# Patient Record
Sex: MALE | Race: WHITE | NOT HISPANIC OR LATINO | Employment: OTHER | URBAN - METROPOLITAN AREA
[De-identification: names, ages, dates, MRNs, and addresses within clinical notes are randomized per-mention and may not be internally consistent; named-entity substitution may affect disease eponyms.]

---

## 2020-09-23 DIAGNOSIS — Z12.11 ENCOUNTER FOR SCREENING COLONOSCOPY: Primary | ICD-10-CM

## 2020-09-23 DIAGNOSIS — Z86.010 HISTORY OF ADENOMATOUS POLYP OF COLON: ICD-10-CM

## 2020-09-25 PROBLEM — Z86.0101 HISTORY OF ADENOMATOUS POLYP OF COLON: Status: ACTIVE | Noted: 2020-09-25

## 2020-09-25 PROBLEM — Z12.11 ENCOUNTER FOR SCREENING COLONOSCOPY: Status: ACTIVE | Noted: 2020-09-25

## 2020-09-25 PROBLEM — Z86.010 HISTORY OF ADENOMATOUS POLYP OF COLON: Status: ACTIVE | Noted: 2020-09-25

## 2020-11-16 ENCOUNTER — TRANSCRIBE ORDERS (OUTPATIENT)
Dept: SLEEP MEDICINE | Facility: HOSPITAL | Age: 70
End: 2020-11-16

## 2020-11-16 DIAGNOSIS — Z01.818 OTHER SPECIFIED PRE-OPERATIVE EXAMINATION: Primary | ICD-10-CM

## 2020-12-11 ENCOUNTER — LAB (OUTPATIENT)
Dept: LAB | Facility: HOSPITAL | Age: 70
End: 2020-12-11

## 2020-12-11 DIAGNOSIS — Z01.818 OTHER SPECIFIED PRE-OPERATIVE EXAMINATION: ICD-10-CM

## 2020-12-11 PROCEDURE — U0004 COV-19 TEST NON-CDC HGH THRU: HCPCS

## 2020-12-11 PROCEDURE — C9803 HOPD COVID-19 SPEC COLLECT: HCPCS

## 2020-12-12 LAB — SARS-COV-2 RNA RESP QL NAA+PROBE: NOT DETECTED

## 2020-12-14 ENCOUNTER — ANESTHESIA (OUTPATIENT)
Dept: GASTROENTEROLOGY | Facility: HOSPITAL | Age: 70
End: 2020-12-14

## 2020-12-14 ENCOUNTER — ANESTHESIA EVENT (OUTPATIENT)
Dept: GASTROENTEROLOGY | Facility: HOSPITAL | Age: 70
End: 2020-12-14

## 2020-12-14 ENCOUNTER — HOSPITAL ENCOUNTER (OUTPATIENT)
Facility: HOSPITAL | Age: 70
Setting detail: HOSPITAL OUTPATIENT SURGERY
Discharge: HOME | End: 2020-12-14
Attending: SURGERY | Admitting: SURGERY

## 2020-12-14 VITALS
BODY MASS INDEX: 38.6 KG/M2 | HEIGHT: 72 IN | SYSTOLIC BLOOD PRESSURE: 117 MMHG | OXYGEN SATURATION: 99 % | HEART RATE: 61 BPM | WEIGHT: 285 LBS | TEMPERATURE: 98.2 F | RESPIRATION RATE: 16 BRPM | DIASTOLIC BLOOD PRESSURE: 82 MMHG

## 2020-12-14 DIAGNOSIS — Z12.11 ENCOUNTER FOR SCREENING COLONOSCOPY: ICD-10-CM

## 2020-12-14 DIAGNOSIS — Z86.010 HISTORY OF ADENOMATOUS POLYP OF COLON: ICD-10-CM

## 2020-12-14 PROBLEM — K63.5 POLYP OF ASCENDING COLON: Status: ACTIVE | Noted: 2020-12-14

## 2020-12-14 PROCEDURE — 25010000002 PROPOFOL 10 MG/ML EMULSION: Performed by: ANESTHESIOLOGY

## 2020-12-14 PROCEDURE — 88305 TISSUE EXAM BY PATHOLOGIST: CPT | Performed by: SURGERY

## 2020-12-14 PROCEDURE — 45380 COLONOSCOPY AND BIOPSY: CPT | Performed by: SURGERY

## 2020-12-14 PROCEDURE — 25010000002 GLUCAGON (RDNA) PER 1 MG: Performed by: SURGERY

## 2020-12-14 PROCEDURE — 45385 COLONOSCOPY W/LESION REMOVAL: CPT | Performed by: SURGERY

## 2020-12-14 PROCEDURE — 45381 COLONOSCOPY SUBMUCOUS NJX: CPT | Performed by: SURGERY

## 2020-12-14 RX ORDER — PROPOFOL 10 MG/ML
VIAL (ML) INTRAVENOUS AS NEEDED
Status: DISCONTINUED | OUTPATIENT
Start: 2020-12-14 | End: 2020-12-14 | Stop reason: SURG

## 2020-12-14 RX ORDER — ATORVASTATIN CALCIUM 10 MG/1
10 TABLET, FILM COATED ORAL DAILY
COMMUNITY
End: 2022-10-24 | Stop reason: HOSPADM

## 2020-12-14 RX ORDER — LISINOPRIL 5 MG/1
5 TABLET ORAL DAILY
COMMUNITY

## 2020-12-14 RX ORDER — LIDOCAINE HYDROCHLORIDE 20 MG/ML
INJECTION, SOLUTION INFILTRATION; PERINEURAL AS NEEDED
Status: DISCONTINUED | OUTPATIENT
Start: 2020-12-14 | End: 2020-12-14 | Stop reason: SURG

## 2020-12-14 RX ORDER — SODIUM CHLORIDE, SODIUM LACTATE, POTASSIUM CHLORIDE, CALCIUM CHLORIDE 600; 310; 30; 20 MG/100ML; MG/100ML; MG/100ML; MG/100ML
30 INJECTION, SOLUTION INTRAVENOUS CONTINUOUS PRN
Status: DISCONTINUED | OUTPATIENT
Start: 2020-12-14 | End: 2020-12-14 | Stop reason: HOSPADM

## 2020-12-14 RX ORDER — ALLOPURINOL 100 MG/1
100 TABLET ORAL DAILY
COMMUNITY

## 2020-12-14 RX ORDER — ASPIRIN 81 MG/1
81 TABLET, CHEWABLE ORAL DAILY
COMMUNITY

## 2020-12-14 RX ADMIN — LIDOCAINE HYDROCHLORIDE 100 MG: 20 INJECTION, SOLUTION INFILTRATION; PERINEURAL at 10:07

## 2020-12-14 RX ADMIN — PROPOFOL 500 MG: 10 INJECTION, EMULSION INTRAVENOUS at 10:07

## 2020-12-14 RX ADMIN — SODIUM CHLORIDE, POTASSIUM CHLORIDE, SODIUM LACTATE AND CALCIUM CHLORIDE 30 ML/HR: 600; 310; 30; 20 INJECTION, SOLUTION INTRAVENOUS at 09:44

## 2020-12-14 NOTE — ANESTHESIA POSTPROCEDURE EVALUATION
Patient: Paulino Pimentel    Procedure Summary     Date: 12/14/20 Room / Location:  [facility]    Anesthesia Start: 1005 Anesthesia Stop: 1049    Procedure: COLONOSCOPY to CECUM AND TI WITH COLD BX/HOT SNARE POLYPECTOMY, NS INJECTION, TATTOO INJECTION (N/A ) Diagnosis:       Encounter for screening colonoscopy      History of adenomatous polyp of colon      (Encounter for screening colonoscopy [Z12.11])      (History of adenomatous polyp of colon [Z86.010])    Surgeon: Anne Cho MD Provider: Lamine Pinon MD    Anesthesia Type: MAC ASA Status: 3          Anesthesia Type: MAC    Vitals  Vitals Value Taken Time   /82 12/14/20 1108   Temp     Pulse 61 12/14/20 1108   Resp 16 12/14/20 1108   SpO2 99 % 12/14/20 1108           Post Anesthesia Care and Evaluation      Comments: Pt. Discharged prior to being evaluated by anesthesia.  Chart is reviewed and no complications are noted.  THIS CASE IS NOT MEDICALLY DIRECTED

## 2020-12-14 NOTE — H&P
Cc: Endoscopy Visit    HPI: 70 y.o. male here for screening with adenomatous polyps in 2010.      Past Medical History:   Diagnosis Date   • Arthritis    • Atrial fibrillation (CMS/HCC)    • Colon polyps    • Sleep apnea     CPAP       Past Surgical History:   Procedure Laterality Date   • CARDIAC SURGERY      HEART STENTS   • COLONOSCOPY     • GALLBLADDER SURGERY  2009   • JOINT REPLACEMENT     • REPLACEMENT TOTAL KNEE BILATERAL  7/2019, 3/2020       is allergic to erythromycin.       Medication List      ASK your doctor about these medications    allopurinol 100 MG tablet  Commonly known as: ZYLOPRIM     apixaban 2.5 MG tablet tablet  Commonly known as: ELIQUIS     aspirin 81 MG chewable tablet     atorvastatin 10 MG tablet  Commonly known as: LIPITOR     dronedarone 400 MG tablet  Commonly known as: MULTAQ     lisinopril 5 MG tablet  Commonly known as: PRINIVIL,ZESTRIL            History reviewed. No pertinent family history.    Social History     Socioeconomic History   • Marital status:      Spouse name: Not on file   • Number of children: Not on file   • Years of education: Not on file   • Highest education level: Not on file   Tobacco Use   • Smoking status: Never Smoker   • Smokeless tobacco: Never Used       Vitals:    12/14/20 0920   BP: 130/73   Pulse: 60   Resp: 12   Temp: 98.2 °F (36.8 °C)   SpO2: 97%       Body mass index is 38.65 kg/m².    Physical Exam    General: No acute distress  Lungs: No labored breathing, Pulse oximetry on room air is 97%.  Heart: RRR  Abdomen: Soft  Mental:  Awake, alert, and oriented    Imp:     · Screening  · adenomatous polyps in 2010.       Plan:  · C scope    Anne Cho MD  10:08 EST

## 2020-12-14 NOTE — OP NOTE
Colonoscopy Procedure Note  Paulino Pimentel  1950  Date of Procedure: 12/14/20    Pre-operative Diagnosis:    · Screening  · adenomatous polyps in 2010.     Post-operative Diagnosis:  · Cecal polyp, 4 mm.  Removed via cold biopsy forceps.   · Irregular ileocecal polyp, transversely oriented, partially retrohaustral, 2.3 cm by 5 mm.  Removed via saline lift, crescent snare (1/2, remaining repeatedly pulling out of the snare), cold biopsy (remaining polyp) and fulguration (edges), with inking (either side).  Felt removed in it's entirety at completion.  Area of question would be retrohaustral.   · Distal transverse 5 mm polyp, rounded.  Removed via snare cautery.  · Proximal descending 5 mm polyp, flat.  Removed via snare cautery.  · Diverticulosis, moderate, descending and sigmoid.    Procedure: Colonoscopy with saline lift, crescent snare, cold biopsy and fulguration, with inking.       Recommendations:   · Colonoscopy in 6 months to ensure complete removal of the ileocecal valve polyp, then 2 years if clear.  The office will call within the next  3-10 days with a final recommendation.  · Keep a copy of the photographs of the procedure given to you today for possible need for reference in the future.      Surgeon: Tammie    Anesthetic: MAC per Lamine Pinon MD    Scope Withdrawal Time:  27 minutes  12 seconds    Procedure Details     MAC anesthesia was induced.  The 180 Colonoscopy was inserted blindly into the rectum and advanced to the cecum, with relative ease,  without need for pressure, lift, or turning.    Cecum was identified by the appendiceal orifice and the ileocecal valve and photographed for documentation.      Prep quality was good but not great.  A careful inspection was made as the scope was withdrawn, including a retroflexed view of the rectum; there was no suggestion of presence of angiodysplasias, or colitis, but there were the aforementioned polyps and diverticula, with interventions as  listed.  Settings on the cautery were 1 and 11 with tissue retrieved via suction thru the scope.     Retroflexion in the rectum revealed no abnormalities.      Anne Cho MD  12/14/20

## 2020-12-14 NOTE — ANESTHESIA PREPROCEDURE EVALUATION
Anesthesia Evaluation     NPO Solid Status: > 8 hours  NPO Liquid Status: > 4 hours           Airway   Mallampati: II  TM distance: >3 FB  Neck ROM: full  no difficulty expected  Dental - normal exam     Pulmonary - normal exam   (+) sleep apnea,   Cardiovascular - normal exam    (+) dysrhythmias Atrial Fib,       Neuro/Psych  GI/Hepatic/Renal/Endo    (+) obesity, morbid obesity,      Musculoskeletal     Abdominal  - normal exam   Substance History      OB/GYN          Other   arthritis,                      Anesthesia Plan    ASA 3     MAC       Anesthetic plan, all risks, benefits, and alternatives have been provided, discussed and informed consent has been obtained with: patient.

## 2020-12-15 LAB
CYTO UR: NORMAL
LAB AP CASE REPORT: NORMAL
PATH REPORT.FINAL DX SPEC: NORMAL
PATH REPORT.GROSS SPEC: NORMAL

## 2020-12-16 DIAGNOSIS — D12.2 ADENOMATOUS POLYP OF ASCENDING COLON: Primary | ICD-10-CM

## 2020-12-16 NOTE — PROGRESS NOTES
Kate (endoscopy liaison),    Please call patient to inform them of these pathology findings (CAPITALIZED) and recommendations (CAPITALIZED) that have been added to my operative note.  Please ensure that any pamphlets that were to be given to the patient at the hospital were received.     Please make sure a letter is sent to the patient, recall method entered into the computer and the HIM tab updated as far as need for endoscopy follow up.    Thanks  Dr Cho    Colonoscopy Procedure Note  Paulino Pimentel  1950  Date of Procedure: 12/14/20     Pre-operative Diagnosis:    · Screening  · adenomatous polyps in 2010.      Post-operative Diagnosis:  · Cecal polyp, 4 mm.  Removed via cold biopsy forceps. ; TUBULAR ADENOMA  · Irregular ileocecal polyp, transversely oriented, partially retrohaustral, 2.3 cm by 5 mm.  Removed via saline lift, crescent snare (1/2, remaining repeatedly pulling out of the snare), cold biopsy (remaining polyp) and fulguration (edges), with inking (either side).  Felt removed in it's entirety at completion.  Area of question would be retrohaustral.;  TUBULOVILLOUS ADENOMA   · Distal transverse 5 mm polyp, rounded.  Removed via snare cautery;  TUBULAR ADENOMA.  · Proximal descending 5 mm polyp, flat.  Removed via snare cautery.;  TUBULAR ADENOMA  · Diverticulosis, moderate, descending and sigmoid.     Procedure: Colonoscopy with saline lift, crescent snare, cold biopsy and fulguration, with inking.    Recommendations:   · Colonoscopy in 6 months to ensure complete removal of the ileocecal valve polyp, then 2 years if clear.  The office will call within the next  3-10 days with a final recommendation.;  YES 6 MONTHS.  CASE REQUEST ALREADY ENTERED.  · Keep a copy of the photographs of the procedure given to you today for possible need for reference in the future.

## 2020-12-17 ENCOUNTER — TELEPHONE (OUTPATIENT)
Dept: SURGERY | Facility: CLINIC | Age: 70
End: 2020-12-17

## 2020-12-17 NOTE — TELEPHONE ENCOUNTER
----- Message from Anne Cho MD sent at 12/16/2020  7:56 AM EST -----  Kate (endoscopy liaison),    Please call patient to inform them of these pathology findings (CAPITALIZED) and recommendations (CAPITALIZED) that have been added to my operative note.  Please ensure that any pamphlets that were to be given to the patient at the hospital were received.     Please make sure a letter is sent to the patient, recall method entered into the computer and the HIM tab updated as far as need for endoscopy follow up.    Thanks  Dr Cho    Colonoscopy Procedure Note  Paulino Margarito  1950  Date of Procedure: 12/14/20     Pre-operative Diagnosis:    · Screening  · adenomatous polyps in 2010.      Post-operative Diagnosis:  · Cecal polyp, 4 mm.  Removed via cold biopsy forceps. ; TUBULAR ADENOMA  · Irregular ileocecal polyp, transversely oriented, partially retrohaustral, 2.3 cm by 5 mm.  Removed via saline lift, crescent snare (1/2, remaining repeatedly pulling out of the snare), cold biopsy (remaining polyp) and fulguration (edges), with inking (either side).  Felt removed in it's entirety at completion.  Area of question would be retrohaustral.;  TUBULOVILLOUS ADENOMA   · Distal transverse 5 mm polyp, rounded.  Removed via snare cautery;  TUBULAR ADENOMA.  · Proximal descending 5 mm polyp, flat.  Removed via snare cautery.;  TUBULAR ADENOMA  · Diverticulosis, moderate, descending and sigmoid.     Procedure: Colonoscopy with saline lift, crescent snare, cold biopsy and fulguration, with inking.                                        Recommendations:   · Colonoscopy in 6 months to ensure complete removal of the ileocecal valve polyp, then 2 years if clear.  The office will call within the next  3-10 days with a final recommendation.;  YES 6 MONTHS.  CASE REQUEST ALREADY ENTERED.  · Keep a copy of the photographs of the procedure given to you today for possible need for reference in the future.

## 2022-10-21 RX ORDER — LEVOTHYROXINE SODIUM 0.1 MG/1
100 TABLET ORAL DAILY
COMMUNITY

## 2022-10-21 RX ORDER — ROSUVASTATIN CALCIUM 10 MG/1
10 TABLET, COATED ORAL DAILY
COMMUNITY

## 2022-10-21 RX ORDER — AMLODIPINE BESYLATE 5 MG/1
5 TABLET ORAL DAILY
COMMUNITY

## 2022-10-21 RX ORDER — BUPROPION HYDROCHLORIDE 150 MG/1
150 TABLET, EXTENDED RELEASE ORAL DAILY
COMMUNITY

## 2022-10-24 ENCOUNTER — ANESTHESIA EVENT (OUTPATIENT)
Dept: GASTROENTEROLOGY | Facility: HOSPITAL | Age: 72
End: 2022-10-24

## 2022-10-24 ENCOUNTER — ANESTHESIA (OUTPATIENT)
Dept: GASTROENTEROLOGY | Facility: HOSPITAL | Age: 72
End: 2022-10-24

## 2022-10-24 ENCOUNTER — HOSPITAL ENCOUNTER (OUTPATIENT)
Facility: HOSPITAL | Age: 72
Setting detail: HOSPITAL OUTPATIENT SURGERY
Discharge: HOME | End: 2022-10-24
Attending: SURGERY | Admitting: SURGERY

## 2022-10-24 VITALS
RESPIRATION RATE: 18 BRPM | HEIGHT: 72 IN | OXYGEN SATURATION: 97 % | SYSTOLIC BLOOD PRESSURE: 127 MMHG | HEART RATE: 49 BPM | DIASTOLIC BLOOD PRESSURE: 53 MMHG | WEIGHT: 271 LBS | BODY MASS INDEX: 36.7 KG/M2

## 2022-10-24 DIAGNOSIS — D12.2 ADENOMATOUS POLYP OF ASCENDING COLON: ICD-10-CM

## 2022-10-24 PROCEDURE — 25010000002 GLUCAGON (RDNA) PER 1 MG: Performed by: SURGERY

## 2022-10-24 PROCEDURE — 45385 COLONOSCOPY W/LESION REMOVAL: CPT | Performed by: SURGERY

## 2022-10-24 PROCEDURE — 25010000002 PROPOFOL 10 MG/ML EMULSION: Performed by: ANESTHESIOLOGY

## 2022-10-24 PROCEDURE — 88305 TISSUE EXAM BY PATHOLOGIST: CPT | Performed by: SURGERY

## 2022-10-24 RX ORDER — PROPOFOL 10 MG/ML
VIAL (ML) INTRAVENOUS AS NEEDED
Status: DISCONTINUED | OUTPATIENT
Start: 2022-10-24 | End: 2022-10-24 | Stop reason: SURG

## 2022-10-24 RX ORDER — LIDOCAINE HYDROCHLORIDE 20 MG/ML
INJECTION, SOLUTION INFILTRATION; PERINEURAL AS NEEDED
Status: DISCONTINUED | OUTPATIENT
Start: 2022-10-24 | End: 2022-10-24 | Stop reason: SURG

## 2022-10-24 RX ORDER — SODIUM CHLORIDE, SODIUM LACTATE, POTASSIUM CHLORIDE, CALCIUM CHLORIDE 600; 310; 30; 20 MG/100ML; MG/100ML; MG/100ML; MG/100ML
1000 INJECTION, SOLUTION INTRAVENOUS CONTINUOUS
Status: DISCONTINUED | OUTPATIENT
Start: 2022-10-24 | End: 2022-10-24 | Stop reason: HOSPADM

## 2022-10-24 RX ADMIN — SODIUM CHLORIDE, POTASSIUM CHLORIDE, SODIUM LACTATE AND CALCIUM CHLORIDE 1000 ML: 600; 310; 30; 20 INJECTION, SOLUTION INTRAVENOUS at 12:24

## 2022-10-24 RX ADMIN — PROPOFOL 200 MCG/KG/MIN: 10 INJECTION, EMULSION INTRAVENOUS at 13:41

## 2022-10-24 RX ADMIN — PROPOFOL 200 MG: 10 INJECTION, EMULSION INTRAVENOUS at 13:41

## 2022-10-24 RX ADMIN — LIDOCAINE HYDROCHLORIDE 60 MG: 20 INJECTION, SOLUTION INFILTRATION; PERINEURAL at 13:41

## 2022-10-24 NOTE — OP NOTE
Colonoscopy Procedure Note  Paulino Pimentel  1950  Date of Procedure: 10/24/22    Pre-operative Diagnosis:    · history of 3 tubular adenomas, one tubulovillous adenoma 2020  · 2 tubular adenomas 2010     Post-operative Diagnosis:  · Ileocecal valve 1.5 cm, partially retrohaustral, rounded, polyp, adjacent to prior ink, small adjoining 3 mm flat polyp.  Removed via snare (piecemeal) cautery and fulguration.  · Descending colon polyps X 2, 5 and 7 mm.  Removed via snare cautery.  · Sigmoid diverticulosis, moderate.    Procedure: Colonoscopy with polypectomy       Recommendations:   · Colonoscopy in 2 years likely, due to the appearance of possible recurrence.  The office will call within the next  3-10 days with a final recommendation.  · Review diverticulosis info given today.  · Keep a copy of the photographs of the procedure given to you today for possible need for reference in the future.      Surgeon: Tammie    Anesthetic: MAC per Orion Andre MD    Scope Withdrawal Time:  17 minutes  40 seconds    Procedure Details     MAC anesthesia was induced.  The 180 Colonoscopy was inserted blindly into the rectum and advanced to the cecum, with relative ease,  without need for pressure, lift, or turning.  However, to get well down into the cecal bowl, lift was needed.  Cecum was identified by the appendiceal orifice and the ileocecal valve and photographed for documentation.      Prep quality was moderate with difficulty visualizing in the cecum due to liquid stool.  A careful inspection was made as the scope was withdrawn, including a retroflexed view of the rectum; there was no suggestion of presence of angiodysplasias, or colitis, but there were the polyps and the diverticula, with noted interventions. Settings on the cautery were 1 and 11 with tissue retrieved via suction thru the scope.     Retroflexion in the rectum revealed no abnormalities.      Anne Cho MD  10/24/22

## 2022-10-24 NOTE — DISCHARGE INSTRUCTIONS
For the next 24 hours patient needs to be with a responsible adult.    For 24 hours DO NOT drive, operate machinery, appliances, drink alcohol, make important decisions or sign legal documents.    Start with a light or bland diet if you are feeling sick to your stomach otherwise advance to regular diet as tolerated.    Follow recommendations on procedure report if provided by your doctor.    Call Dr Boone for problems [phone]    Problems may include but not limited to: large amounts of bleeding, trouble breathing, repeated vomiting, severe unrelieved pain, fever or chills.

## 2022-10-24 NOTE — ANESTHESIA POSTPROCEDURE EVALUATION
Patient: Paulino Pimentel    Procedure Summary     Date: 10/24/22 Room / Location:  [facility]    Anesthesia Start: 1336 Anesthesia Stop: 1411    Procedure: COLONOSCOPY TO CECUM WITH HOT SNARE POLYPECTOMIES Diagnosis:       Adenomatous polyp of ascending colon      (Adenomatous polyp of ascending colon [D12.2])    Surgeons: Anne Cho MD Provider: Orion Andre MD    Anesthesia Type: MAC ASA Status: 3          Anesthesia Type: MAC    Vitals  No vitals data found for the desired time range.          Post Anesthesia Care and Evaluation    Patient location during evaluation: PHASE II  Patient participation: complete - patient participated  Level of consciousness: awake and alert  Pain management: adequate    Airway patency: patent  Anesthetic complications: No anesthetic complications  PONV Status: none  Cardiovascular status: acceptable and hemodynamically stable  Respiratory status: acceptable, nonlabored ventilation and spontaneous ventilation  Hydration status: acceptable

## 2022-10-24 NOTE — H&P
Cc: Endoscopy Visit    HPI: 72 y.o. male here for screening with prior history of 3 tubular adenomas, one tubulovillous adenoma 2020, 2 tubular adenomas 2010.  She has no family history of colon cancer.     Past Medical History:   Diagnosis Date   • Arthritis    • Atrial fibrillation (HCC)    • Colon polyps    • Sleep apnea     CPAP       Past Surgical History:   Procedure Laterality Date   • CARDIAC SURGERY      HEART STENTS   • COLONOSCOPY     • COLONOSCOPY N/A 12/14/2020    Procedure: COLONOSCOPY to CECUM AND TI WITH COLD BX/HOT SNARE POLYPECTOMY, NS INJECTION, TATTOO INJECTION;  Surgeon: Anne Cho MD;  Location: [facility];  Service: General;  Laterality: N/A;  HX POLYPS, SCREENING  --POLYPS (4), DIVERTICULOSIS    • GALLBLADDER SURGERY  2009   • JOINT REPLACEMENT     • REPLACEMENT TOTAL KNEE BILATERAL  7/2019, 3/2020       is allergic to metoprolol and erythromycin.       Medication List      ASK your doctor about these medications    allopurinol 100 MG tablet  Commonly known as: ZYLOPRIM     amLODIPine 5 MG tablet  Commonly known as: NORVASC     apixaban 2.5 MG tablet tablet  Commonly known as: ELIQUIS  Take 1 tablet by mouth Every 12 (Twelve) Hours.     aspirin 81 MG chewable tablet     atorvastatin 10 MG tablet  Commonly known as: LIPITOR     buPROPion  MG 12 hr tablet  Commonly known as: WELLBUTRIN SR     dronedarone 400 MG tablet  Commonly known as: MULTAQ     levothyroxine 100 MCG tablet  Commonly known as: SYNTHROID, LEVOTHROID     lisinopril 5 MG tablet  Commonly known as: PRINIVIL,ZESTRIL     rosuvastatin 10 MG tablet  Commonly known as: CRESTOR            Family History   Problem Relation Age of Onset   • Malig Hyperthermia Neg Hx        Social History     Socioeconomic History   • Marital status:    Tobacco Use   • Smoking status: Never   • Smokeless tobacco: Never       Vitals:    10/24/22 1217   BP: 138/77   Pulse: 61   Resp: 18   SpO2: 99%       Body mass index is 36.75  kg/m².    Physical Exam    General: No acute distress  Lungs: No labored breathing, Pulse oximetry on room air is 99%.  Heart/EKG: RRR  Abdomen: no complaints of pain  Mental:  Awake, alert, and oriented    Imp:     · history of 3 tubular adenomas, one tubulovillous adenoma 2020  · 2 tubular adenomas 2010      Plan:  · C scope    Anne Cho MD  13:03 EDT

## 2022-10-24 NOTE — ANESTHESIA PREPROCEDURE EVALUATION
Anesthesia Evaluation     Patient summary reviewed and Nursing notes reviewed   NPO Solid Status: > 8 hours  NPO Liquid Status: > 6 hours           Airway   Mallampati: III  TM distance: >3 FB  Neck ROM: full  Possible difficult intubation  Dental - normal exam     Pulmonary - normal exam   (+) sleep apnea on CPAP,   Cardiovascular     (+) hypertension 2 medications or greater, dysrhythmias Paroxysmal Atrial Fib,       Neuro/Psych  GI/Hepatic/Renal/Endo    (+) obesity, morbid obesity,      Musculoskeletal     Abdominal   (+) obese,    Substance History      OB/GYN          Other   arthritis,                      Anesthesia Plan    ASA 3     MAC     (POM due to morbid obesity and DEMETRIO)  intravenous induction     Anesthetic plan, risks, benefits, and alternatives have been provided, discussed and informed consent has been obtained with: patient.        CODE STATUS:

## 2022-10-25 LAB
LAB AP CASE REPORT: NORMAL
LAB AP DIAGNOSIS COMMENT: NORMAL
PATH REPORT.FINAL DX SPEC: NORMAL
PATH REPORT.GROSS SPEC: NORMAL

## 2022-10-26 ENCOUNTER — TELEPHONE (OUTPATIENT)
Dept: SURGERY | Facility: CLINIC | Age: 72
End: 2022-10-26

## 2022-10-26 NOTE — PROGRESS NOTES
Kate (endoscopy liaison),    Please call patient tto inform them of these findings and recommendations and ensure that any pamphlets that were to be given to the patient at the hospital were received.     Please make sure a letter is sent to the patient, recall method entered into the computer and the HM (Health Maintenance) tab updated as far as need for endoscopy follow up.    Thanks  Dr Cho    Colonoscopy Procedure Note  Paulino Pimentel  1950  Date of Procedure: 10/24/22     Pre-operative Diagnosis:    · history of 3 tubular adenomas, one tubulovillous adenoma 2020  · 2 tubular adenomas 2010      Post-operative Diagnosis:  · Ileocecal valve 1.5 cm, partially retrohaustral, rounded, polyp, adjacent to prior ink, small adjoining 3 mm flat polyp.  Removed via snare (piecemeal) cautery and fulguration.;  TUBULOVILLOUS ADENOMA  · Descending colon polyps X 2, 5 and 7 mm.  Removed via snare cautery.;  HYPERPLASTIC.  · Sigmoid diverticulosis, moderate.     Procedure: Colonoscopy with polypectomy    Recommendations:   · Colonoscopy in 2 years likely, due to the appearance of possible recurrence.  The office will call within the next  3-10 days with a final recommendation.;  YES 1 OR 2 YEARS, AT HIS PREFERENCE.   · Review diverticulosis info given today.  · Keep a copy of the photographs of the procedure given to you today for possible need for reference in the future.

## 2022-10-26 NOTE — TELEPHONE ENCOUNTER
----- Message from Anne Cho MD sent at 10/26/2022 10:36 AM EDT -----  Kate (endoscopy liaison),    Please call patient tto inform them of these findings and recommendations and ensure that any pamphlets that were to be given to the patient at the hospital were received.     Please make sure a letter is sent to the patient, recall method entered into the computer and the HM (Health Maintenance) tab updated as far as need for endoscopy follow up.    Thanks  Dr Cho    Colonoscopy Procedure Note  Paulino Pimentel  1950  Date of Procedure: 10/24/22     Pre-operative Diagnosis:    · history of 3 tubular adenomas, one tubulovillous adenoma 2020  · 2 tubular adenomas 2010      Post-operative Diagnosis:  · Ileocecal valve 1.5 cm, partially retrohaustral, rounded, polyp, adjacent to prior ink, small adjoining 3 mm flat polyp.  Removed via snare (piecemeal) cautery and fulguration.;  TUBULOVILLOUS ADENOMA  · Descending colon polyps X 2, 5 and 7 mm.  Removed via snare cautery.;  HYPERPLASTIC.  · Sigmoid diverticulosis, moderate.     Procedure: Colonoscopy with polypectomy                                        Recommendations:   · Colonoscopy in 2 years likely, due to the appearance of possible recurrence.  The office will call within the next  3-10 days with a final recommendation.;  YES 1 OR 2 YEARS, AT HIS PREFERENCE.   · Review diverticulosis info given today.  · Keep a copy of the photographs of the procedure given to you today for possible need for reference in the future.

## 2022-10-27 ENCOUNTER — TELEPHONE (OUTPATIENT)
Dept: SURGERY | Facility: CLINIC | Age: 72
End: 2022-10-27

## 2022-10-27 NOTE — TELEPHONE ENCOUNTER
Provider:   Caller: ANNA CARMONA   Relationship to Patient: SELF  Pharmacy:   Phone Number: [phone]   Reason for Call:  RECTAL BLEEDING AFTER FIRST BOWEL MOVEMENT SINCE CSCOPE  When was the patient last seen: 10.24.22  When did it start: 10.26.22  Where is it located: RECTUM  Characteristics of symptom/severity: RECTAL BLEEDING- LIQUID STOOL NOT SOLID YET; COLORED MOSTLY RED. MENTIONS A FILLING-UP SENSATION. ALSO FEELS LIGHTHEADED.   Timing- Is it constant or intermittent: STARTED 10.24.22 AND THROUGHOUT THE NIGHT  What makes it worse:   What makes it better:   What therapies/medications have you tried:       HUB UNABLE TO WARM TRANSFER

## 2022-10-28 NOTE — TELEPHONE ENCOUNTER
Patient returning MA's call. He states that the bleeding began Monday, subsided Tuesday and Wednesday, began again Wednesday night until around 3:00 am this morning. He confirmed that it was bright red blood with clots.  I spoke directly with Dr. Cho and she has advised that the patient hold his Eliquis for 5 days but to call back on Monday if he is still experiencing bleeding. I relayed this to the patient and he voiced understanding.

## 2024-10-06 ENCOUNTER — HOSPITAL ENCOUNTER (INPATIENT)
Facility: HOSPITAL | Age: 74
LOS: 3 days | Discharge: HOME | End: 2024-10-09
Attending: EMERGENCY MEDICINE | Admitting: SURGERY
Payer: MEDICARE

## 2024-10-06 ENCOUNTER — APPOINTMENT (OUTPATIENT)
Dept: CT IMAGING | Facility: HOSPITAL | Age: 74
End: 2024-10-06
Payer: MEDICARE

## 2024-10-06 ENCOUNTER — APPOINTMENT (OUTPATIENT)
Dept: GENERAL RADIOLOGY | Facility: HOSPITAL | Age: 74
End: 2024-10-06
Payer: MEDICARE

## 2024-10-06 DIAGNOSIS — R10.84 GENERALIZED ABDOMINAL PAIN: Primary | ICD-10-CM

## 2024-10-06 DIAGNOSIS — K56.609 SMALL BOWEL OBSTRUCTION: ICD-10-CM

## 2024-10-06 DIAGNOSIS — R11.2 NAUSEA AND VOMITING, UNSPECIFIED VOMITING TYPE: ICD-10-CM

## 2024-10-06 LAB
ALBUMIN SERPL-MCNC: 4.5 G/DL (ref 3.5–5.2)
ALBUMIN/GLOB SERPL: 1.3 G/DL
ALP SERPL-CCNC: 47 U/L (ref 39–117)
ALT SERPL W P-5'-P-CCNC: 44 U/L (ref 1–41)
ANION GAP SERPL CALCULATED.3IONS-SCNC: 14.5 MMOL/L (ref 5–15)
AST SERPL-CCNC: 43 U/L (ref 1–40)
BASOPHILS # BLD AUTO: 0.02 10*3/MM3 (ref 0–0.2)
BASOPHILS NFR BLD AUTO: 0.4 % (ref 0–1.5)
BILIRUB SERPL-MCNC: 1 MG/DL (ref 0–1.2)
BUN SERPL-MCNC: 30 MG/DL (ref 8–23)
BUN/CREAT SERPL: 19.1 (ref 7–25)
CALCIUM SPEC-SCNC: 10.2 MG/DL (ref 8.6–10.5)
CHLORIDE SERPL-SCNC: 102 MMOL/L (ref 98–107)
CO2 SERPL-SCNC: 21.5 MMOL/L (ref 22–29)
CREAT SERPL-MCNC: 1.57 MG/DL (ref 0.76–1.27)
DEPRECATED RDW RBC AUTO: 46 FL (ref 37–54)
EGFRCR SERPLBLD CKD-EPI 2021: 45.7 ML/MIN/1.73
EOSINOPHIL # BLD AUTO: 0.01 10*3/MM3 (ref 0–0.4)
EOSINOPHIL NFR BLD AUTO: 0.2 % (ref 0.3–6.2)
ERYTHROCYTE [DISTWIDTH] IN BLOOD BY AUTOMATED COUNT: 13.3 % (ref 12.3–15.4)
GLOBULIN UR ELPH-MCNC: 3.6 GM/DL
GLUCOSE SERPL-MCNC: 132 MG/DL (ref 65–99)
HCT VFR BLD AUTO: 50.7 % (ref 37.5–51)
HGB BLD-MCNC: 17.3 G/DL (ref 13–17.7)
IMM GRANULOCYTES # BLD AUTO: 0.01 10*3/MM3 (ref 0–0.05)
IMM GRANULOCYTES NFR BLD AUTO: 0.2 % (ref 0–0.5)
LIPASE SERPL-CCNC: 25 U/L (ref 13–60)
LYMPHOCYTES # BLD AUTO: 0.45 10*3/MM3 (ref 0.7–3.1)
LYMPHOCYTES NFR BLD AUTO: 10 % (ref 19.6–45.3)
MCH RBC QN AUTO: 32.2 PG (ref 26.6–33)
MCHC RBC AUTO-ENTMCNC: 34.1 G/DL (ref 31.5–35.7)
MCV RBC AUTO: 94.4 FL (ref 79–97)
MONOCYTES # BLD AUTO: 0.5 10*3/MM3 (ref 0.1–0.9)
MONOCYTES NFR BLD AUTO: 11.2 % (ref 5–12)
NEUTROPHILS NFR BLD AUTO: 3.49 10*3/MM3 (ref 1.7–7)
NEUTROPHILS NFR BLD AUTO: 78 % (ref 42.7–76)
NRBC BLD AUTO-RTO: 0 /100 WBC (ref 0–0.2)
PLATELET # BLD AUTO: 144 10*3/MM3 (ref 140–450)
PMV BLD AUTO: 9.4 FL (ref 6–12)
POTASSIUM SERPL-SCNC: 4.5 MMOL/L (ref 3.5–5.2)
PROT SERPL-MCNC: 8.1 G/DL (ref 6–8.5)
RBC # BLD AUTO: 5.37 10*6/MM3 (ref 4.14–5.8)
SODIUM SERPL-SCNC: 138 MMOL/L (ref 136–145)
WBC NRBC COR # BLD AUTO: 4.48 10*3/MM3 (ref 3.4–10.8)

## 2024-10-06 PROCEDURE — 25510000001 IOPAMIDOL PER 1 ML: Performed by: EMERGENCY MEDICINE

## 2024-10-06 PROCEDURE — 74018 RADEX ABDOMEN 1 VIEW: CPT

## 2024-10-06 PROCEDURE — 93010 ELECTROCARDIOGRAM REPORT: CPT | Performed by: INTERNAL MEDICINE

## 2024-10-06 PROCEDURE — 74177 CT ABD & PELVIS W/CONTRAST: CPT

## 2024-10-06 PROCEDURE — 83690 ASSAY OF LIPASE: CPT | Performed by: EMERGENCY MEDICINE

## 2024-10-06 PROCEDURE — 25010000002 ONDANSETRON PER 1 MG: Performed by: EMERGENCY MEDICINE

## 2024-10-06 PROCEDURE — 93005 ELECTROCARDIOGRAM TRACING: CPT | Performed by: EMERGENCY MEDICINE

## 2024-10-06 PROCEDURE — 99285 EMERGENCY DEPT VISIT HI MDM: CPT | Performed by: EMERGENCY MEDICINE

## 2024-10-06 PROCEDURE — 25010000002 MORPHINE PER 10 MG: Performed by: EMERGENCY MEDICINE

## 2024-10-06 PROCEDURE — 85025 COMPLETE CBC W/AUTO DIFF WBC: CPT | Performed by: EMERGENCY MEDICINE

## 2024-10-06 PROCEDURE — 80053 COMPREHEN METABOLIC PANEL: CPT | Performed by: EMERGENCY MEDICINE

## 2024-10-06 RX ORDER — LEVOTHYROXINE SODIUM 50 UG/1
100 TABLET ORAL DAILY
Status: DISCONTINUED | OUTPATIENT
Start: 2024-10-07 | End: 2024-10-09 | Stop reason: HOSPADM

## 2024-10-06 RX ORDER — ASPIRIN 81 MG/1
81 TABLET, CHEWABLE ORAL DAILY
Status: DISCONTINUED | OUTPATIENT
Start: 2024-10-07 | End: 2024-10-09 | Stop reason: HOSPADM

## 2024-10-06 RX ORDER — SODIUM CHLORIDE 0.9 % (FLUSH) 0.9 %
10 SYRINGE (ML) INJECTION AS NEEDED
Status: DISCONTINUED | OUTPATIENT
Start: 2024-10-06 | End: 2024-10-09 | Stop reason: HOSPADM

## 2024-10-06 RX ORDER — DEXTROSE MONOHYDRATE, SODIUM CHLORIDE, AND POTASSIUM CHLORIDE 50; 1.49; 4.5 G/1000ML; G/1000ML; G/1000ML
100 INJECTION, SOLUTION INTRAVENOUS CONTINUOUS
Status: DISPENSED | OUTPATIENT
Start: 2024-10-06 | End: 2024-10-07

## 2024-10-06 RX ORDER — IOPAMIDOL 755 MG/ML
100 INJECTION, SOLUTION INTRAVASCULAR
Status: COMPLETED | OUTPATIENT
Start: 2024-10-06 | End: 2024-10-06

## 2024-10-06 RX ORDER — ONDANSETRON 2 MG/ML
4 INJECTION INTRAMUSCULAR; INTRAVENOUS ONCE
Status: COMPLETED | OUTPATIENT
Start: 2024-10-06 | End: 2024-10-06

## 2024-10-06 RX ORDER — LISINOPRIL 5 MG/1
5 TABLET ORAL DAILY
Status: DISCONTINUED | OUTPATIENT
Start: 2024-10-07 | End: 2024-10-09 | Stop reason: HOSPADM

## 2024-10-06 RX ORDER — ONDANSETRON 4 MG/1
4 TABLET, ORALLY DISINTEGRATING ORAL EVERY 6 HOURS PRN
Status: DISCONTINUED | OUTPATIENT
Start: 2024-10-06 | End: 2024-10-09 | Stop reason: HOSPADM

## 2024-10-06 RX ORDER — ALLOPURINOL 100 MG/1
100 TABLET ORAL DAILY
Status: DISCONTINUED | OUTPATIENT
Start: 2024-10-07 | End: 2024-10-09 | Stop reason: HOSPADM

## 2024-10-06 RX ORDER — AMLODIPINE BESYLATE 5 MG/1
5 TABLET ORAL DAILY
Status: DISCONTINUED | OUTPATIENT
Start: 2024-10-07 | End: 2024-10-09 | Stop reason: HOSPADM

## 2024-10-06 RX ORDER — ONDANSETRON 2 MG/ML
4 INJECTION INTRAMUSCULAR; INTRAVENOUS EVERY 6 HOURS PRN
Status: DISCONTINUED | OUTPATIENT
Start: 2024-10-06 | End: 2024-10-09 | Stop reason: HOSPADM

## 2024-10-06 RX ORDER — NALOXONE HCL 0.4 MG/ML
0.4 VIAL (ML) INJECTION
Status: DISCONTINUED | OUTPATIENT
Start: 2024-10-06 | End: 2024-10-09 | Stop reason: HOSPADM

## 2024-10-06 RX ORDER — ROSUVASTATIN CALCIUM 5 MG/1
10 TABLET, COATED ORAL DAILY
Status: DISCONTINUED | OUTPATIENT
Start: 2024-10-07 | End: 2024-10-09 | Stop reason: HOSPADM

## 2024-10-06 RX ORDER — HYDROMORPHONE HYDROCHLORIDE 1 MG/ML
0.5 INJECTION, SOLUTION INTRAMUSCULAR; INTRAVENOUS; SUBCUTANEOUS
Status: DISCONTINUED | OUTPATIENT
Start: 2024-10-06 | End: 2024-10-09 | Stop reason: HOSPADM

## 2024-10-06 RX ORDER — SODIUM CHLORIDE 0.9 % (FLUSH) 0.9 %
10 SYRINGE (ML) INJECTION EVERY 12 HOURS SCHEDULED
Status: DISCONTINUED | OUTPATIENT
Start: 2024-10-06 | End: 2024-10-09 | Stop reason: HOSPADM

## 2024-10-06 RX ORDER — ACETAMINOPHEN 160 MG/5ML
650 SOLUTION ORAL EVERY 4 HOURS PRN
Status: DISCONTINUED | OUTPATIENT
Start: 2024-10-06 | End: 2024-10-09 | Stop reason: HOSPADM

## 2024-10-06 RX ORDER — ACETAMINOPHEN 325 MG/1
650 TABLET ORAL EVERY 4 HOURS PRN
Status: DISCONTINUED | OUTPATIENT
Start: 2024-10-06 | End: 2024-10-09 | Stop reason: HOSPADM

## 2024-10-06 RX ORDER — BUPROPION HYDROCHLORIDE 150 MG/1
150 TABLET, EXTENDED RELEASE ORAL DAILY
Status: DISCONTINUED | OUTPATIENT
Start: 2024-10-07 | End: 2024-10-09 | Stop reason: HOSPADM

## 2024-10-06 RX ORDER — ACETAMINOPHEN 650 MG/1
650 SUPPOSITORY RECTAL EVERY 4 HOURS PRN
Status: DISCONTINUED | OUTPATIENT
Start: 2024-10-06 | End: 2024-10-09 | Stop reason: HOSPADM

## 2024-10-06 RX ADMIN — MORPHINE SULFATE 2 MG: 4 INJECTION, SOLUTION INTRAMUSCULAR; INTRAVENOUS at 19:13

## 2024-10-06 RX ADMIN — ONDANSETRON 4 MG: 2 INJECTION INTRAMUSCULAR; INTRAVENOUS at 19:13

## 2024-10-06 RX ADMIN — Medication 10 ML: at 23:42

## 2024-10-06 RX ADMIN — DEXTROSE, SODIUM CHLORIDE, AND POTASSIUM CHLORIDE 100 ML/HR: 5; .45; .15 INJECTION INTRAVENOUS at 23:17

## 2024-10-06 RX ADMIN — IOPAMIDOL 100 ML: 755 INJECTION, SOLUTION INTRAVENOUS at 19:35

## 2024-10-06 NOTE — ED PROVIDER NOTES
Subjective   History of Present Illness  This is a 74-year-old male who comes in for evaluation.  He states that Thursday night into Friday morning began with some nausea and vomiting little bit of diarrhea.  He states his wife and had a GI virus and he thought maybe this was what was going on with him however he states now the pain is beginning to feel like prior episodes of bowel obstruction.  The pain is mostly periumbilical and he states it feels like pins-and-needles and beginning to radiate to the left side of the abdomen.  He has not noticed any blood in the vomit or in the diarrhea.  He denies any fever chills or chest pain.  He states his last episode of bowel obstruction he believes was in December of last year.  He denies any chest pain, syncope    History provided by:  Patient and spouse      Review of Systems   Constitutional:  Negative for fatigue and fever.   HENT: Negative.     Respiratory:  Negative for cough and shortness of breath.    Cardiovascular:  Negative for chest pain and palpitations.   Gastrointestinal:  Positive for abdominal pain, diarrhea, nausea and vomiting.   Genitourinary: Negative.    Skin: Negative.    Neurological: Negative.    All other systems reviewed and are negative.      Past Medical History:   Diagnosis Date    Arthritis     Atrial fibrillation     Colon polyps     Hypertension     Sleep apnea     CPAP       Allergies   Allergen Reactions    Metoprolol Arrhythmia     Per patient it causes his AFIB to become worse    Erythromycin Hives       Past Surgical History:   Procedure Laterality Date    CARDIAC SURGERY      HEART STENTS    COLONOSCOPY      COLONOSCOPY N/A 12/14/2020    Procedure: COLONOSCOPY to CECUM AND TI WITH COLD BX/HOT SNARE POLYPECTOMY, NS INJECTION, TATTOO INJECTION;  Surgeon: Anne Cho MD;  Location: [facility] ENDOSCOPY;  Service: General;  Laterality: N/A;  HX POLYPS, SCREENING  --POLYPS (4), DIVERTICULOSIS     COLONOSCOPY N/A 10/24/2022    Procedure:  COLONOSCOPY TO CECUM WITH HOT SNARE POLYPECTOMIES;  Surgeon: Anne hCo MD;  Location: [facility] ENDOSCOPY;  Service: General;  Laterality: N/A;  HX POLYPS   --  POLYPS, DIVERTICULOSIS    GALLBLADDER SURGERY  2009    JOINT REPLACEMENT      REPLACEMENT TOTAL KNEE BILATERAL  7/2019, 3/2020       Family History   Problem Relation Age of Onset    Malig Hyperthermia Neg Hx        Social History     Socioeconomic History    Marital status:    Tobacco Use    Smoking status: Never    Smokeless tobacco: Never   Vaping Use    Vaping status: Never Used   Substance and Sexual Activity    Alcohol use: Not Currently     Alcohol/week: 7.0 standard drinks of alcohol     Types: 4 Glasses of wine, 3 Cans of beer per week    Drug use: Never    Sexual activity: Defer           Objective   Physical Exam  Vitals and nursing note reviewed.   Constitutional:       General: He is not in acute distress.     Appearance: He is not toxic-appearing.   HENT:      Head: Normocephalic and atraumatic.      Right Ear: External ear normal.      Left Ear: External ear normal.      Mouth/Throat:      Mouth: Mucous membranes are moist.   Eyes:      Extraocular Movements: Extraocular movements intact.      Pupils: Pupils are equal, round, and reactive to light.   Cardiovascular:      Rate and Rhythm: Normal rate.   Pulmonary:      Effort: Pulmonary effort is normal. No respiratory distress.   Abdominal:      General: Abdomen is protuberant. Bowel sounds are absent. There is no distension.      Palpations: Abdomen is soft.      Tenderness:  in the periumbilical area There is no guarding or rebound.       Musculoskeletal:         General: No deformity or signs of injury. Normal range of motion.      Cervical back: Normal range of motion and neck supple.   Skin:     General: Skin is warm and dry.      Findings: No rash.   Neurological:      General: No focal deficit present.      Mental Status: He is alert and oriented to person, place, and time.          Procedures           ED Course  ED Course as of 10/08/24 1703   Sun Oct 06, 2024   2010 20:10 EDT  I received pt in sign-out at end of shift, pending results and disposition.  On my exam, pt generally well-appearing and in no distress.  Pt with a couple days of abdominal pain and vomiting, similar to previous SBO.  CT confirms SBO.  Discussed with Dr. Jon, surgery, who agreed to admit pt, requested NGT placement.  Updated pt and family on results and poc. [JA]      ED Course User Index  [JA] Kirk Srivastava MD                                             Medical Decision Making  74-year-old with nausea vomiting and diarrhea mid week which has now progressed to periumbilical abdominal pain radiating to the left.  Patient states the pain has changed and is reminiscent of prior episodes of bowel obstruction.    Will check labs, EKG, CT of the abdomen and pelvis.    7:06 PM EDT Case signed out to oncoming provider for CT results and re-evaluation and final disposition    Problems Addressed:  Generalized abdominal pain: complicated acute illness or injury  Nausea and vomiting, unspecified vomiting type: complicated acute illness or injury    Amount and/or Complexity of Data Reviewed  External Data Reviewed: notes.  Labs: ordered.  Radiology: ordered.  ECG/medicine tests: ordered and independent interpretation performed.     Details: Sinus tachycardia, rate 104.  Nonspecific ST-T wave changes.  No ST elevation    Risk  Prescription drug management.  Decision regarding hospitalization.        Final diagnoses:   Generalized abdominal pain   Nausea and vomiting, unspecified vomiting type   Small bowel obstruction       ED Disposition  ED Disposition       None            No follow-up provider specified.       Medication List      No changes were made to your prescriptions during this visit.            Mariela Hunt MD  10/06/24 1909       Mariela Hunt MD  10/08/24 170

## 2024-10-06 NOTE — Clinical Note
Level of Care: Med/Surg [1]   Admitting Physician: REGINO ORNELAS [[id number]]   Patient Class: Inpatient [101]

## 2024-10-07 ENCOUNTER — APPOINTMENT (OUTPATIENT)
Dept: GENERAL RADIOLOGY | Facility: HOSPITAL | Age: 74
End: 2024-10-07
Payer: MEDICARE

## 2024-10-07 LAB
ANION GAP SERPL CALCULATED.3IONS-SCNC: 15.1 MMOL/L (ref 5–15)
BUN SERPL-MCNC: 37 MG/DL (ref 8–23)
BUN/CREAT SERPL: 23.4 (ref 7–25)
CALCIUM SPEC-SCNC: 9.8 MG/DL (ref 8.6–10.5)
CHLORIDE SERPL-SCNC: 102 MMOL/L (ref 98–107)
CO2 SERPL-SCNC: 21.9 MMOL/L (ref 22–29)
CREAT SERPL-MCNC: 1.58 MG/DL (ref 0.76–1.27)
DEPRECATED RDW RBC AUTO: 46.5 FL (ref 37–54)
EGFRCR SERPLBLD CKD-EPI 2021: 45.3 ML/MIN/1.73
ERYTHROCYTE [DISTWIDTH] IN BLOOD BY AUTOMATED COUNT: 13.2 % (ref 12.3–15.4)
GLUCOSE SERPL-MCNC: 120 MG/DL (ref 65–99)
HCT VFR BLD AUTO: 49.1 % (ref 37.5–51)
HGB BLD-MCNC: 16.5 G/DL (ref 13–17.7)
MCH RBC QN AUTO: 32 PG (ref 26.6–33)
MCHC RBC AUTO-ENTMCNC: 33.6 G/DL (ref 31.5–35.7)
MCV RBC AUTO: 95.3 FL (ref 79–97)
PLATELET # BLD AUTO: 166 10*3/MM3 (ref 140–450)
PMV BLD AUTO: 9.8 FL (ref 6–12)
POTASSIUM SERPL-SCNC: 4.3 MMOL/L (ref 3.5–5.2)
QT INTERVAL: 327 MS
QTC INTERVAL: 431 MS
RBC # BLD AUTO: 5.15 10*6/MM3 (ref 4.14–5.8)
SODIUM SERPL-SCNC: 139 MMOL/L (ref 136–145)
WBC NRBC COR # BLD AUTO: 3.76 10*3/MM3 (ref 3.4–10.8)

## 2024-10-07 PROCEDURE — 80048 BASIC METABOLIC PNL TOTAL CA: CPT | Performed by: SURGERY

## 2024-10-07 PROCEDURE — 74019 RADEX ABDOMEN 2 VIEWS: CPT

## 2024-10-07 PROCEDURE — 63710000001 ONDANSETRON ODT 4 MG TABLET DISPERSIBLE: Performed by: SURGERY

## 2024-10-07 PROCEDURE — 25010000002 ONDANSETRON PER 1 MG: Performed by: SURGERY

## 2024-10-07 PROCEDURE — 85027 COMPLETE CBC AUTOMATED: CPT | Performed by: SURGERY

## 2024-10-07 PROCEDURE — 94761 N-INVAS EAR/PLS OXIMETRY MLT: CPT

## 2024-10-07 PROCEDURE — 25010000002 HYDROMORPHONE PER 4 MG: Performed by: SURGERY

## 2024-10-07 PROCEDURE — 0 DIATRIZOATE MEGLUMINE & SODIUM PER 1 ML: Performed by: SURGERY

## 2024-10-07 PROCEDURE — 99222 1ST HOSP IP/OBS MODERATE 55: CPT | Performed by: SURGERY

## 2024-10-07 PROCEDURE — 74250 X-RAY XM SM INT 1CNTRST STD: CPT

## 2024-10-07 RX ORDER — DIATRIZOATE MEGLUMINE AND DIATRIZOATE SODIUM 660; 100 MG/ML; MG/ML
120 SOLUTION ORAL; RECTAL ONCE
Status: COMPLETED | OUTPATIENT
Start: 2024-10-07 | End: 2024-10-07

## 2024-10-07 RX ORDER — DEXTROSE MONOHYDRATE, SODIUM CHLORIDE, AND POTASSIUM CHLORIDE 50; 1.49; 4.5 G/1000ML; G/1000ML; G/1000ML
75 INJECTION, SOLUTION INTRAVENOUS CONTINUOUS
Status: DISPENSED | OUTPATIENT
Start: 2024-10-07 | End: 2024-10-08

## 2024-10-07 RX ADMIN — Medication 10 ML: at 21:48

## 2024-10-07 RX ADMIN — HYDROMORPHONE HYDROCHLORIDE 0.5 MG: 1 INJECTION, SOLUTION INTRAMUSCULAR; INTRAVENOUS; SUBCUTANEOUS at 21:52

## 2024-10-07 RX ADMIN — ACETAMINOPHEN 650 MG: 325 TABLET ORAL at 09:52

## 2024-10-07 RX ADMIN — ONDANSETRON 4 MG: 4 TABLET, ORALLY DISINTEGRATING ORAL at 09:55

## 2024-10-07 RX ADMIN — DEXTROSE, SODIUM CHLORIDE, AND POTASSIUM CHLORIDE 75 ML/HR: 5; .45; .15 INJECTION INTRAVENOUS at 17:10

## 2024-10-07 RX ADMIN — Medication 10 ML: at 09:55

## 2024-10-07 RX ADMIN — DRONEDARONE 400 MG: 400 TABLET, FILM COATED ORAL at 17:10

## 2024-10-07 RX ADMIN — ONDANSETRON 4 MG: 4 TABLET, ORALLY DISINTEGRATING ORAL at 00:32

## 2024-10-07 RX ADMIN — DIATRIZOATE MEGLUMINE AND DIATRIZOATE SODIUM 120 ML: 660; 100 LIQUID ORAL; RECTAL at 08:59

## 2024-10-07 RX ADMIN — BUPROPION HYDROCHLORIDE 150 MG: 150 TABLET, FILM COATED, EXTENDED RELEASE ORAL at 08:45

## 2024-10-07 RX ADMIN — ONDANSETRON 4 MG: 2 INJECTION INTRAMUSCULAR; INTRAVENOUS at 17:19

## 2024-10-07 RX ADMIN — LEVOTHYROXINE SODIUM 100 MCG: 50 TABLET ORAL at 08:45

## 2024-10-07 RX ADMIN — DRONEDARONE 400 MG: 400 TABLET, FILM COATED ORAL at 08:45

## 2024-10-07 RX ADMIN — ACETAMINOPHEN 650 MG: 325 TABLET ORAL at 17:18

## 2024-10-07 NOTE — NURSING NOTE
RN called Dr. Jon to clarify to give the pt his Multaq or not due to pt's NPO status and current NG tube suctioning. Dr. Jon ordered to clamp NG tube for 30 minutes. No other new orders at this time.

## 2024-10-07 NOTE — NURSING NOTE
RN called Dr. Jon again to let the provider know that the drug he wanted to give was not available on the unit, it currently is only at central pharmacy. Dr. Jon had no new orders at this time.

## 2024-10-07 NOTE — ED NOTES
Nursing report ED to floor  Paulino Pimentel  74 y.o.  male    HPI :   Chief Complaint   Patient presents with    Abdominal Pain     Since Friday night/early Saturday morning, with associated n/v/d. Pt with hx of SBO multiple times.        Admitting doctor:   Elroy Jon MD    Admitting diagnosis:   The primary encounter diagnosis was Generalized abdominal pain. A diagnosis of Nausea and vomiting, unspecified vomiting type was also pertinent to this visit.    Code status:   Current Code Status       Date Active Code Status Order ID Comments User Context       10/6/2024 2056 CPR (Attempt to Resuscitate) [id number]  Elroy Jon MD ED        Question Answer    Code Status (Patient has no pulse and is not breathing) CPR (Attempt to Resuscitate)    Medical Interventions (Patient has pulse or is breathing) Full Support                    Allergies:   Metoprolol and Erythromycin    Isolation:   No active isolations    Intake and Output  No intake or output data in the 24 hours ending 10/06/24 2203    Weight:       10/06/24  1818   Weight: 127 kg (280 lb)       Most recent vitals:   Vitals:    10/06/24 2000 10/06/24 2040 10/06/24 2100 10/06/24 2130   BP: 130/80 130/83 126/90 135/99   BP Location:       Patient Position:       Pulse: 100 113 115 111   Resp: 18 18 16    Temp:       TempSrc:       SpO2: 90% 93% 92% 94%   Weight:       Height:           Active LDAs/IV Access:   Lines, Drains & Airways       Active LDAs       Name Placement date Placement time Site Days    Peripheral IV 10/06/24 1900 Left Antecubital 10/06/24  1900  Antecubital  less than 1    NG/OG Tube Nasogastric 14 Fr Right nostril 10/06/24  2035  Right nostril  less than 1                    Labs (abnormal labs have a star):   Labs Reviewed   COMPREHENSIVE METABOLIC PANEL - Abnormal; Notable for the following components:       Result Value    Glucose 132 (*)     BUN 30 (*)     Creatinine 1.57 (*)     CO2 21.5 (*)     ALT (SGPT) 44 (*)     AST (SGOT) 43  (*)     eGFR 45.7 (*)     All other components within normal limits    Narrative:     GFR Normal >60  Chronic Kidney Disease <60  Kidney Failure <15    The GFR formula is only valid for adults with stable renal function between ages 18 and 70.   CBC WITH AUTO DIFFERENTIAL - Abnormal; Notable for the following components:    Neutrophil % 78.0 (*)     Lymphocyte % 10.0 (*)     Eosinophil % 0.2 (*)     Lymphocytes, Absolute 0.45 (*)     All other components within normal limits   LIPASE - Normal   CBC AND DIFFERENTIAL    Narrative:     The following orders were created for panel order CBC & Differential.  Procedure                               Abnormality         Status                     ---------                               -----------         ------                     CBC Auto Differential[[id number]]        Abnormal            Final result                 Please view results for these tests on the individual orders.       Results       Procedure Component Value Ref Range Date/Time    Comprehensive Metabolic Panel [[id number]]  (Abnormal) Collected: 10/06/24 1900    Order Status: Completed Specimen: Blood Updated: 10/06/24 1922     Glucose 132 65 - 99 mg/dL      BUN 30 8 - 23 mg/dL      Creatinine 1.57 0.76 - 1.27 mg/dL      Sodium 138 136 - 145 mmol/L      Potassium 4.5 3.5 - 5.2 mmol/L      Chloride 102 98 - 107 mmol/L      CO2 21.5 22.0 - 29.0 mmol/L      Calcium 10.2 8.6 - 10.5 mg/dL      Total Protein 8.1 6.0 - 8.5 g/dL      Albumin 4.5 3.5 - 5.2 g/dL      ALT (SGPT) 44 1 - 41 U/L      AST (SGOT) 43 1 - 40 U/L      Alkaline Phosphatase 47 39 - 117 U/L      Total Bilirubin 1.0 0.0 - 1.2 mg/dL      Globulin 3.6 gm/dL      A/G Ratio 1.3 g/dL      BUN/Creatinine Ratio 19.1 7.0 - 25.0      Anion Gap 14.5 5.0 - 15.0 mmol/L      eGFR 45.7 >60.0 mL/min/1.73     Narrative:      GFR Normal >60  Chronic Kidney Disease <60  Kidney Failure <15    The GFR formula is only valid for adults with stable renal function between  ages 18 and 70.    Lipase [[id number]]  (Normal) Collected: 10/06/24 1900    Order Status: Completed Specimen: Blood Updated: 10/06/24 1922     Lipase 25 13 - 60 U/L     CBC Auto Differential [[id number]]  (Abnormal) Collected: 10/06/24 1900    Order Status: Completed Specimen: Blood Updated: 10/06/24 1906     WBC 4.48 3.40 - 10.80 10*3/mm3      RBC 5.37 4.14 - 5.80 10*6/mm3      Hemoglobin 17.3 13.0 - 17.7 g/dL      Hematocrit 50.7 37.5 - 51.0 %      MCV 94.4 79.0 - 97.0 fL      MCH 32.2 26.6 - 33.0 pg      MCHC 34.1 31.5 - 35.7 g/dL      RDW 13.3 12.3 - 15.4 %      RDW-SD 46.0 37.0 - 54.0 fl      MPV 9.4 6.0 - 12.0 fL      Platelets 144 140 - 450 10*3/mm3      Neutrophil % 78.0 42.7 - 76.0 %      Lymphocyte % 10.0 19.6 - 45.3 %      Monocyte % 11.2 5.0 - 12.0 %      Eosinophil % 0.2 0.3 - 6.2 %      Basophil % 0.4 0.0 - 1.5 %      Immature Grans % 0.2 0.0 - 0.5 %      Neutrophils, Absolute 3.49 1.70 - 7.00 10*3/mm3      Lymphocytes, Absolute 0.45 0.70 - 3.10 10*3/mm3      Monocytes, Absolute 0.50 0.10 - 0.90 10*3/mm3      Eosinophils, Absolute 0.01 0.00 - 0.40 10*3/mm3      Basophils, Absolute 0.02 0.00 - 0.20 10*3/mm3      Immature Grans, Absolute 0.01 0.00 - 0.05 10*3/mm3      nRBC 0.0 0.0 - 0.2 /100 WBC              EKG:   ECG 12 Lead Other; Abdominal pain   Preliminary Result   HEART OAXG=276  bpm   RR Xscaketh=728  ms   WI Interval=160  ms   P Horizontal Axis=7  deg   P Front Axis=31  deg   QRSD Interval=96  ms   QT Pabmyjqg=525  ms   BNcI=232  ms   QRS Axis=56  deg   T Wave Axis=-34  deg   - ABNORMAL ECG -   Sinus tachycardia   Low voltage, precordial leads   Anteroseptal infarct, old   Borderline T abnormalities, inferior leads   Date and Time of Study:2024-10-06 19:02:52          Meds given in ED:   Medications   ondansetron (ZOFRAN) injection 4 mg (4 mg Intravenous Given 10/6/24 1913)   morphine injection 2 mg (2 mg Intravenous Given 10/6/24 1913)   iopamidol (ISOVUE-370) 76 % injection 100 mL (100 mL  Intravenous Given 10/6/24 1935)       Imaging results:  XR Abdomen KUB    Result Date: 10/6/2024  Impression: Enteric tube within the stomach. Electronically Signed: May Urbina MD  10/6/2024 9:38 PM EDT  Workstation ID: [id number]    CT Abdomen Pelvis With Contrast    Result Date: 10/6/2024  Small bowel obstruction with transition roughly at the proximal aspect of the ileum Electronically Signed: Serge Cote  10/6/2024 7:48 PM EDT  Workstation ID: [id number]     Ambulatory status:   - bedrest    Social issues:   Social History     Socioeconomic History    Marital status:    Tobacco Use    Smoking status: Never    Smokeless tobacco: Never   Vaping Use    Vaping status: Never Used   Substance and Sexual Activity    Alcohol use: Not Currently     Alcohol/week: 7.0 standard drinks of alcohol     Types: 4 Glasses of wine, 3 Cans of beer per week    Drug use: Never    Sexual activity: Defer       NIH Stroke Scale: NA         Alex Bueno RN  10/06/24 22:03 EDT

## 2024-10-07 NOTE — CASE MANAGEMENT/SOCIAL WORK
Continued Stay Note  KIKA Lorenz     Patient Name: Paulino Pimentel  MRN: [id number]  Today's Date: 10/7/2024    Admit Date: 10/6/2024    Plan: plan home with wife   Discharge Plan       Row Name 10/07/24 1107       Plan    Plan plan home with wife    Patient/Family in Agreement with Plan yes    Plan Comments Spoke with patient at bedside, he is resting in bed with NG in place. Face sheet verified. Patient lives in a home with his wife. He is independent of ADLs including driving. He states cpap is only DME. He has used HH in the past, but does not recall the agency. He has not used inpatient rehab previously. He does not have a living will onfile. He sees Dr Ferro as PCP. He uses [facility] . He denies issues obtaining medications. He will used [facility] for this admission. SDOH completed. Plan for SBFT today. CM # placed on white board, will continue to follow for dc needs.                   Discharge Codes    No documentation.                       Derrick Jimenez RN

## 2024-10-07 NOTE — PLAN OF CARE
Goal Outcome Evaluation:              Outcome Evaluation: pt came in the ER tonight with abdominal pain, N/V. Pt had a CT abd. and pelvis showing a SOB. NG tube was placed in ER, secured at 60 at low wall suction. Pt denies any pain at this time. Pt did have an episode of nausea and was treated with PRN meds. Vitals remain stable at this time.

## 2024-10-07 NOTE — H&P
ASSESSMENT/PLAN:    74-year-old gentleman whose only previous abdominal surgery was laparoscopic cholecystectomy presents with recurrent bowel obstruction symptoms and confirmatory CT scan.  His abdominal exam is completely benign and nontender.  He does not seem particularly distended.  I have ordered small bowel follow-through for further evaluation and we will proceed according to the results.    CC:     Bowel obstruction    HPI:    74-year-old gentleman whose only previous abdominal surgery was laparoscopic cholecystectomy presents with a recurrent bowel obstruction with symptoms of generalized abdominal pain, nausea and vomiting.  His pain is resolved since admission.  He was admitted to [facility] with small bowel obstruction in 2016 and this resolved with conservative management.  He was also admitted to [facility] in 2014 with small bowel obstruction and again this resolved with conservative measures.  Relevant review of systems negative other than presenting complaints.    ENDOSCOPY:   Colonoscopy 10/24/2022: Multiple benign polyps and moderate sigmoid diverticulosis.  2-year surveillance was recommended.  Pathology of ileocecal valve polyp was tubulovillous.  Pathology of descending colon polyp was hyperplastic.    RADIOLOGY:   CT abdomen pelvis 10/6/2024: Small bowel obstruction with transition roughly at the proximal aspect of the ileum.  I reviewed the images and concur.  Flat and upright abdominal x-ray 10/7/2024: No significant change and dilated small bowel loops consistent with small bowel obstruction, I reviewed the images and concur.    LABS:    BUN 37, creatinine 1.58, GFR 45  WBC 3.7, hemoglobin 16.5, platelets 166    SOCIAL HISTORY:   Denies tobacco use  Denies alcohol use    FAMILY HISTORY:    Colorectal cancer: Negative    PREVIOUS ABDOMINAL SURGERY    Cholecystectomy 2009    PAST MEDICAL HISTORY:    Arthritis  Atrial fibrillation  Hypertension  Sleep apnea, uses CPAP    MEDICATIONS:    Allopurinol  Amlodipine  Eliquis  Wellbutrin  Aspirin 81 mg  Dronedarone  Levothyroxine  Lisinopril  Rosuvastatin    ALLERGIES:   Metoprolol-arrhythmia  Erythromycin-hives     PHYSICAL EXAM:   Constitutional: No acute distress  Vital signs:   Weight: 268 pounds  Height: 72 inches  BMI: 36.4  Blood pressure 117/83  Heart rate 86  Respiratory 18  Temperature 97.6  Respiratory: Normal nonlabored inspiratory effort  Cardiovascular: Regular rate, no jugular venous distention  Gastrointestinal: Soft, seemingly nondistended, completely nontender    KARYNA DOYLE M.D.

## 2024-10-08 ENCOUNTER — PREP FOR SURGERY (OUTPATIENT)
Dept: OTHER | Facility: HOSPITAL | Age: 74
End: 2024-10-08
Payer: COMMERCIAL

## 2024-10-08 ENCOUNTER — APPOINTMENT (OUTPATIENT)
Dept: GENERAL RADIOLOGY | Facility: HOSPITAL | Age: 74
End: 2024-10-08
Payer: MEDICARE

## 2024-10-08 DIAGNOSIS — Z86.0101 HISTORY OF ADENOMATOUS POLYP OF COLON: Primary | ICD-10-CM

## 2024-10-08 PROCEDURE — 94761 N-INVAS EAR/PLS OXIMETRY MLT: CPT

## 2024-10-08 PROCEDURE — 94799 UNLISTED PULMONARY SVC/PX: CPT

## 2024-10-08 PROCEDURE — 25010000002 ONDANSETRON PER 1 MG: Performed by: SURGERY

## 2024-10-08 PROCEDURE — 74018 RADEX ABDOMEN 1 VIEW: CPT

## 2024-10-08 PROCEDURE — 99232 SBSQ HOSP IP/OBS MODERATE 35: CPT | Performed by: STUDENT IN AN ORGANIZED HEALTH CARE EDUCATION/TRAINING PROGRAM

## 2024-10-08 PROCEDURE — 25010000002 HYDROMORPHONE PER 4 MG: Performed by: SURGERY

## 2024-10-08 RX ADMIN — ACETAMINOPHEN 650 MG: 325 TABLET ORAL at 08:50

## 2024-10-08 RX ADMIN — DRONEDARONE 400 MG: 400 TABLET, FILM COATED ORAL at 08:40

## 2024-10-08 RX ADMIN — Medication 10 ML: at 08:43

## 2024-10-08 RX ADMIN — DEXTROSE, SODIUM CHLORIDE, AND POTASSIUM CHLORIDE 75 ML/HR: 5; .45; .15 INJECTION INTRAVENOUS at 05:33

## 2024-10-08 RX ADMIN — HYDROMORPHONE HYDROCHLORIDE 0.5 MG: 1 INJECTION, SOLUTION INTRAMUSCULAR; INTRAVENOUS; SUBCUTANEOUS at 03:42

## 2024-10-08 RX ADMIN — Medication 10 ML: at 21:18

## 2024-10-08 RX ADMIN — ONDANSETRON 4 MG: 2 INJECTION INTRAMUSCULAR; INTRAVENOUS at 21:18

## 2024-10-08 RX ADMIN — HYDROMORPHONE HYDROCHLORIDE 0.5 MG: 1 INJECTION, SOLUTION INTRAMUSCULAR; INTRAVENOUS; SUBCUTANEOUS at 21:18

## 2024-10-08 RX ADMIN — LEVOTHYROXINE SODIUM 100 MCG: 50 TABLET ORAL at 08:41

## 2024-10-08 RX ADMIN — DRONEDARONE 400 MG: 400 TABLET, FILM COATED ORAL at 17:35

## 2024-10-08 RX ADMIN — BUPROPION HYDROCHLORIDE 150 MG: 150 TABLET, FILM COATED, EXTENDED RELEASE ORAL at 08:41

## 2024-10-08 NOTE — PROGRESS NOTES
"General Surgery Progress Note    Summary: Mr. Paulino Pimentel is a 74 y.o. year old gentleman with a resolving small bowel obstruction.  Had multiple bowel movements overnight.  Removed NG tube and start clears.  Okay to advance as tolerated.  Likely okay for home tomorrow if continues to progress.    Chief Complaint: Abdominal pain    Interval Events: No acute events overnight.  Has had multiple bowel movements overnight and is passing gas.  No nausea or vomiting.    Vitals: /81 (BP Location: Left arm, Patient Position: Lying)   Pulse 66   Temp 97.5 °F (36.4 °C) (Oral)   Resp 16   Ht 182.9 cm (72\")   Wt 122 kg (268 lb 3.2 oz)   SpO2 95%   BMI 36.37 kg/m²     GENERAL: alert, well appearing, and in no distress  HEENT: normocephalic, atraumatic, moist mucous membranes, clear sclerae   CHEST: no increased work of breathing, symmetric air entry  CARDIAC: regular rate and rhythm    ABDOMEN: Soft, nondistended  EXTREMITIES: no cyanosis, clubbing, or edema   SKIN: Warm and moist, no rashes    Labs:  Results from last 7 days   Lab Units 10/07/24  0424 10/06/24  1900   WBC 10*3/mm3 3.76 4.48   HEMOGLOBIN g/dL 16.5 17.3   HEMATOCRIT % 49.1 50.7   PLATELETS 10*3/mm3 166 144     Results from last 7 days   Lab Units 10/07/24  0424 10/06/24  1900   SODIUM mmol/L 139 138   POTASSIUM mmol/L 4.3 4.5   CHLORIDE mmol/L 102 102   CO2 mmol/L 21.9* 21.5*   BUN mg/dL 37* 30*   CREATININE mg/dL 1.58* 1.57*   CALCIUM mg/dL 9.8 10.2   BILIRUBIN mg/dL  --  1.0   ALK PHOS U/L  --  47   ALT (SGPT) U/L  --  44*   AST (SGOT) U/L  --  43*   GLUCOSE mg/dL 120* 132*           JUMANA FORBES MD  General and Endoscopic Surgery  [facility]    [address], [address]  [place], [place], [address]  P: [phone]  F: [phone]     "

## 2024-10-08 NOTE — PLAN OF CARE
Goal Outcome Evaluation:  Plan of Care Reviewed With: patient        Progress: improving  Outcome Evaluation: vss. ngt removed by MD, diet advanced to clear liquids. iv fluids in place. pt up independently in room. ambulation encouraged. headache controlled with prn tylenol. no other complaints at this time. currently resting in room. family at bedside.

## 2024-10-08 NOTE — PLAN OF CARE
Goal Outcome Evaluation:  Plan of Care Reviewed With: patient        Progress: improving  Outcome Evaluation: pt has no complaints of nausea over night. NG to LWS. ambulated to bathroom- having bowel movements and passing gas. VSS, all care explained, questions encouraged and answered.

## 2024-10-09 VITALS
SYSTOLIC BLOOD PRESSURE: 157 MMHG | HEART RATE: 59 BPM | TEMPERATURE: 98.9 F | RESPIRATION RATE: 18 BRPM | HEIGHT: 72 IN | DIASTOLIC BLOOD PRESSURE: 77 MMHG | BODY MASS INDEX: 36.33 KG/M2 | WEIGHT: 268.2 LBS | OXYGEN SATURATION: 96 %

## 2024-10-09 PROCEDURE — 99231 SBSQ HOSP IP/OBS SF/LOW 25: CPT | Performed by: SURGERY

## 2024-10-09 PROCEDURE — 99238 HOSP IP/OBS DSCHRG MGMT 30/<: CPT | Performed by: SURGERY

## 2024-10-09 RX ADMIN — ACETAMINOPHEN 650 MG: 325 TABLET ORAL at 08:43

## 2024-10-09 RX ADMIN — LEVOTHYROXINE SODIUM 100 MCG: 50 TABLET ORAL at 08:40

## 2024-10-09 RX ADMIN — BUPROPION HYDROCHLORIDE 150 MG: 150 TABLET, FILM COATED, EXTENDED RELEASE ORAL at 08:40

## 2024-10-09 RX ADMIN — DRONEDARONE 400 MG: 400 TABLET, FILM COATED ORAL at 08:40

## 2024-10-09 RX ADMIN — Medication 10 ML: at 08:45

## 2024-10-09 NOTE — PLAN OF CARE
Goal Outcome Evaluation:  Plan of Care Reviewed With: patient        Progress: improving  Outcome Evaluation: vss, clear liquid diet, complained of nausea and pain once- meds given, see MAR. Up independently in room, ambulation encouraged. slept overnight.

## 2024-10-09 NOTE — DISCHARGE INSTR - APPOINTMENTS
Pt will receive a call from PCP office to schedule a follow up appointment.                       [phone]

## 2024-10-09 NOTE — PROGRESS NOTES
"        Surgery Progress Note   Chief Complaint: Small bowel obstruction    Subjective     Interval History:     Steve is passing a lot of gas.  He is tolerated his clear liquid diet.  He feels like his symptoms have significantly improved.  He has yet to try any food other than clear liquids.      Objective     Vital Signs  Temp:  [95.6 °F (35.3 °C)-97.9 °F (36.6 °C)] 97.7 °F (36.5 °C)  Heart Rate:  [53-93] 53  Resp:  [16] 16  BP: ()/(54-89) 125/68  Body mass index is 36.37 kg/m².    Intake/Output Summary (Last 24 hours) at 10/9/2024 0805  Last data filed at 10/8/2024 1358  Gross per 24 hour   Intake 240 ml   Output 300 ml   Net -60 ml     No intake/output data recorded.       Physical Exam:   General: patient awake, alert and cooperative   Cardiovascular: regular rhythm and rate   Pulm: clear to auscultation bilaterally   Abdomen: Soft, good bowel sounds, no tenderness to palpation   Extremities: no rash or edema   Neurologic: Normal mood and behavior     Results Review:     I reviewed the patient's new clinical results.      WBC No results found for: \"WBCS\"   HGB Hemoglobin   Date Value Ref Range Status   10/07/2024 16.5 13.0 - 17.7 g/dL Final   10/06/2024 17.3 13.0 - 17.7 g/dL Final      HCT Hematocrit   Date Value Ref Range Status   10/07/2024 49.1 37.5 - 51.0 % Final   10/06/2024 50.7 37.5 - 51.0 % Final      Platlets No results found for: \"LABPLAT\"     PT/INR:  No results found for: \"PROTIME\"/No results found for: \"INR\"    Sodium Sodium   Date Value Ref Range Status   10/07/2024 139 136 - 145 mmol/L Final   10/06/2024 138 136 - 145 mmol/L Final      Potassium Potassium   Date Value Ref Range Status   10/07/2024 4.3 3.5 - 5.2 mmol/L Final   10/06/2024 4.5 3.5 - 5.2 mmol/L Final      Chloride Chloride   Date Value Ref Range Status   10/07/2024 102 98 - 107 mmol/L Final   10/06/2024 102 98 - 107 mmol/L Final      Bicarbonate No results found for: \"PLASMABICARB\"   BUN BUN   Date Value Ref Range Status " "  10/07/2024 37 (H) 8 - 23 mg/dL Final   10/06/2024 30 (H) 8 - 23 mg/dL Final      Creatinine Creatinine   Date Value Ref Range Status   10/07/2024 1.58 (H) 0.76 - 1.27 mg/dL Final   10/06/2024 1.57 (H) 0.76 - 1.27 mg/dL Final      Calcium Calcium   Date Value Ref Range Status   10/07/2024 9.8 8.6 - 10.5 mg/dL Final   10/06/2024 10.2 8.6 - 10.5 mg/dL Final      Magnesium  AST  ALT  Bilirubin, Total  AlkPhos  Albumin    Amylase  Lipase    Radiology: No results found for: \"MG\"  No components found for: \"AST.*\"  No components found for: \"ALT.*\"  No results found for: \"BILIRUBIN\"    No components found for: \"ALKPHOS.*\"  No components found for: \"ALBUMIN.*\"      No components found for: \"AMYLASE.*\"  No components found for: \"LIPASE.*\"            Imaging Results (Most Recent)       Procedure Component Value Units Date/Time    XR Abdomen KUB [[id number]] Collected: 10/08/24 0649     Updated: 10/08/24 0654    Narrative:      XR ABDOMEN KUB    Date of Exam: 10/8/2024 5:00 AM EDT    Indication: SBO follow up    Comparison: 10/7/2024 and prior    Findings:  There are persistent distended air-filled loops of small bowel noted throughout the abdomen. Oral contrast is noted within the colon to the level of the rectum compatible with progression of contrast suggesting delayed transit, ileus rather than a   complete small bowel obstruction. NG tube is seen overlying the expected position of the stomach. Osseous structures are stable      Impression:      Impression:  Oral contrast has progressed to the colon      Electronically Signed: Danny Quintana MD    10/8/2024 6:51 AM EDT    Workstation ID: [id number]    FL Small Bowel Follow Through Single-Contrast [[id number]] Collected: 10/07/24 1706     Updated: 10/07/24 1711    Narrative:      FL SMALL BOWEL FOLLOW THROUGH SINGLE-CONTRAST    Date of Exam: 10/7/2024 8:56 AM EDT    Indication: SBO evaluation.    Comparison: CT abdomen 10/6/2024, abdominal radiograph 10/6/2024 and " 10/7/2024    Technique:   image of the abdomen was obtained. Patient ingested medium density barium for single contrast imaging of the small bowel.  Examination was recorded with multiple large field of view radiographs and spot fluoroscopic images.    Findings:  Limited single contrast small bowel follow-through was performed using Gastrografin.    Nasogastric tube is noted to be in place in the stomach which is nondistended. There are air-filled distended loops of small bowel throughout the abdomen.    4-hour image demonstrates contrast filling multiple distended loops of small bowel within the mid abdomen. At 8 hours there is contrast within the ascending colon. There are some nondilated loops of opacified small bowel within the right lower quadrant.   Findings would again be compatible with partial small bowel obstruction. Visualized portions of the colon are nondilated. No abnormal calcifications overlie the abdomen or pelvis. There are surgical clips over the right upper quadrant from prior   cholecystectomy.      Impression:      Impression:    1. Distended loops of small bowel throughout the abdomen and pelvis consistent with partial small bowel obstruction. Contrast was noted to reach the colon by 8 hours.      Electronically Signed: Kannan Cavazos MD    10/7/2024 5:07 PM EDT    Workstation ID: [id number]    XR Abdomen Flat & Upright [[id number]] Collected: 10/07/24 0650     Updated: 10/07/24 0654    Narrative:      XR ABDOMEN FLAT AND UPRIGHT    Date of Exam: 10/7/2024 6:15 AM EDT    Indication: sbo follow up    Comparison: 10/6/2024.    Findings:  Dilated small bowel loops present which appears similar as compared to the previous study. Enteric tube present within the stomach.. A mild stool burden is present.  No suspicious calcifications identified. No acute osseous abnormality identified.      Impression:      Impression:  No significant change in dilated small bowel loops consistent with small bowel  obstruction. Enteric tube present within the stomach.        Electronically Signed: May Urbina MD    10/7/2024 6:51 AM EDT    Workstation ID: [id number]    XR Abdomen KUB [[id number]] Collected: 10/06/24 2137     Updated: 10/06/24 2141    Narrative:      XR ABDOMEN KUB    Date of Exam: 10/6/2024 9:22 PM EDT    Indication: verify nasogastric tube placement    Comparison: 10/6/2024.    Findings:  Enteric tube within the stomach. No evidence of free air. Dilated bowel loops present, similar.      Impression:      Impression:  Enteric tube within the stomach.        Electronically Signed: May Urbina MD    10/6/2024 9:38 PM EDT    Workstation ID: [id number]    CT Abdomen Pelvis With Contrast [[id number]] Collected: 10/06/24 1942     Updated: 10/06/24 1951    Narrative:      CT ABDOMEN PELVIS W CONTRAST    Date of Exam: 10/6/2024 7:24 PM EDT    Indication: Nausea vomiting.  Mid abdominal pain.  History of bowel obstruction.    Comparison: None available.    Technique: Axial CT images were obtained of the abdomen and pelvis following the uneventful intravenous administration of iodinated contrast. Sagittal and coronal reconstructions were performed.  Automated exposure control and iterative reconstruction   methods were used.        Findings:    Lower Chest: Lung bases clear    Organs: Liver, spleen, pancreas, adrenal glands unremarkable. Bilateral renal cysts. No hydronephrosis. Gallbladder surgically absent    Gastrointestinal: Diverticulum of the second segment of the duodenum. Dilatation of small bowel in the abdomen and pelvis. Transition to decompressed small bowel occurs in the right lower quadrant, roughly at the proximal ileum. No mass at the   transition. No intestinal wall thickening or pneumatosis. Colonic diverticula without inflammation. Normal appendix    Pelvis: No abnormal fluid collection. Urinary bladder unremarkable    Peritoneum/Retroperitoneum: No free fluid. No pneumoperitoneum. Normal caliber  aorta    Bones/Soft Tissues: No acute bony abnormality      Impression:      Small bowel obstruction with transition roughly at the proximal aspect of the ileum                      Electronically Signed: Serge Cote    10/6/2024 7:48 PM EDT    Workstation ID: [id number]                      Assessment & Plan     Active Hospital Problems    Diagnosis     **Small bowel obstruction         Small bowel obstruction  We will continue to advance his diet and hopefully he can go home later today if he continues to do well.      Aida Hartmann DO  10/09/24  08:05 EDT

## 2024-10-10 ENCOUNTER — READMISSION MANAGEMENT (OUTPATIENT)
Dept: CALL CENTER | Facility: HOSPITAL | Age: 74
End: 2024-10-10
Payer: COMMERCIAL

## 2024-10-10 NOTE — OUTREACH NOTE
Prep Survey      Flowsheet Row Responses   [facility] facility patient discharged from? [facility]   Is LACE score < 7 ? Yes   Eligibility Readm Mgmt   Discharge diagnosis Small bowel obstruction   Does the patient have one of the following disease processes/diagnoses(primary or secondary)? Other   Does the patient have Home health ordered? No   Is there a DME ordered? No   Medication alerts for this patient see avs   Prep survey completed? Yes            Jenny ASIF - Registered Nurse

## 2024-10-10 NOTE — CASE MANAGEMENT/SOCIAL WORK
Case Management Discharge Note      Final Note: dc home         Selected Continued Care - Discharged on 10/9/2024 Admission date: 10/6/2024 - Discharge disposition: Home or Self Care      Destination    No services have been selected for the patient.                Durable Medical Equipment    No services have been selected for the patient.                Dialysis/Infusion    No services have been selected for the patient.                Home Medical Care    No services have been selected for the patient.                Therapy    No services have been selected for the patient.                Community Resources    No services have been selected for the patient.                Community & DME    No services have been selected for the patient.                         Final Discharge Disposition Code: 01 - home or self-care

## 2024-10-14 NOTE — DISCHARGE SUMMARY
DATE OF ADMIT:    10/6/2024    DATE OF DISCHARGE:    10/9/2024    DIAGNOSIS:    Small bowel obstruction    RADIOGRAPHIC STUDIES SUMMARY:  CT abdomen pelvis 10/6/2024: Small bowel obstruction with transition roughly at the proximal aspect of the ileum.  I reviewed the images and concur.  Flat and upright abdominal x-ray 10/7/2024: No significant change and dilated small bowel loops consistent with small bowel obstruction, I reviewed the images and concur.  Small bowel follow-through 10/7/2024: Distended loops of small bowel throughout the abdomen and pelvis consistent with partial small bowel obstruction.  Contrast was noted to reach the colon by 8 hours.  KUB 10/8/2024: Oral contrast has progressed to the colon.  Persistent distended air-filled loops of small bowel noted throughout the abdomen.    SUMMARY OF HOSPITAL COURSE:     74-year-old gentleman whose only past surgical history is laparoscopic cholecystectomy presented with recurrent bowel obstruction symptoms and confirmatory CT scan.  He had a benign abdominal exam and was managed conservatively.  Over the course of the hospital stay, he had resolution of abdominal pain, return of bowel function, and ultimately tolerance of regular diet.    DIET: Regular    ACTIVITY: Ad candice.    MEDICATIONS: No changes made to preadmission medication list    FOLLOW-UP: As needed    Kolby Mathis M.D.

## 2024-10-15 ENCOUNTER — READMISSION MANAGEMENT (OUTPATIENT)
Dept: CALL CENTER | Facility: HOSPITAL | Age: 74
End: 2024-10-15
Payer: COMMERCIAL

## 2024-10-15 NOTE — OUTREACH NOTE
LAG < 7 Survey      Flowsheet Row Responses   [facility]ab[facility] facility patient discharged from? [facility]   Does the patient have one of the following disease processes/diagnoses(primary or secondary)? Other   BHLAG <7 Attempt successful? No   Unsuccessful attempts Attempt 1            Brittany Stallworth Registered Nurse

## 2024-10-22 ENCOUNTER — READMISSION MANAGEMENT (OUTPATIENT)
Dept: CALL CENTER | Facility: HOSPITAL | Age: 74
End: 2024-10-22
Payer: COMMERCIAL

## 2024-10-22 NOTE — OUTREACH NOTE
LAG < 7 Survey      Flowsheet Row Responses   Yarsanism facility patient discharged from? [facility]   Does the patient have one of the following disease processes/diagnoses(primary or secondary)? Other   BHLAG <7 Attempt successful? No   Unsuccessful attempts Attempt 2            KAYLA MOREJON - Registered Nurse

## 2024-10-24 ENCOUNTER — READMISSION MANAGEMENT (OUTPATIENT)
Dept: CALL CENTER | Facility: HOSPITAL | Age: 74
End: 2024-10-24
Payer: COMMERCIAL

## 2024-10-24 NOTE — OUTREACH NOTE
LAG < 7 Survey      Flowsheet Row Responses   Gnosticism facility patient discharged from? [facility]   Does the patient have one of the following disease processes/diagnoses(primary or secondary)? Other   BHLAG <7 Attempt successful? No   Unsuccessful attempts Attempt 3            Clementina WHYTE - Registered Nurse

## 2025-01-05 ENCOUNTER — HOSPITAL ENCOUNTER (EMERGENCY)
Facility: HOSPITAL | Age: 75
Discharge: HOME | End: 2025-01-05
Attending: EMERGENCY MEDICINE | Admitting: EMERGENCY MEDICINE
Payer: COMMERCIAL

## 2025-01-05 ENCOUNTER — APPOINTMENT (OUTPATIENT)
Dept: GENERAL RADIOLOGY | Facility: HOSPITAL | Age: 75
End: 2025-01-05
Payer: COMMERCIAL

## 2025-01-05 ENCOUNTER — APPOINTMENT (OUTPATIENT)
Dept: CT IMAGING | Facility: HOSPITAL | Age: 75
End: 2025-01-05
Payer: COMMERCIAL

## 2025-01-05 VITALS
WEIGHT: 310 LBS | SYSTOLIC BLOOD PRESSURE: 134 MMHG | DIASTOLIC BLOOD PRESSURE: 74 MMHG | OXYGEN SATURATION: 94 % | RESPIRATION RATE: 18 BRPM | HEART RATE: 85 BPM | HEIGHT: 71 IN | BODY MASS INDEX: 43.4 KG/M2 | TEMPERATURE: 98 F
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
## 2025-01-05 DIAGNOSIS — S32.009A CLOSED FRACTURE OF TRANSVERSE PROCESS OF LUMBAR VERTEBRA, INITIAL ENCOUNTER: ICD-10-CM

## 2025-01-05 DIAGNOSIS — S22.42XA CLOSED FRACTURE OF MULTIPLE RIBS OF LEFT SIDE, INITIAL ENCOUNTER: ICD-10-CM

## 2025-01-05 PROCEDURE — 99284 EMERGENCY DEPT VISIT MOD MDM: CPT | Performed by: EMERGENCY MEDICINE

## 2025-01-05 PROCEDURE — 25010000002 HYDROMORPHONE 1 MG/ML SOLUTION: Performed by: EMERGENCY MEDICINE

## 2025-01-05 PROCEDURE — 71250 CT THORAX DX C-: CPT

## 2025-01-05 PROCEDURE — 63710000001 ONDANSETRON ODT 4 MG TABLET DISPERSIBLE: Performed by: EMERGENCY MEDICINE

## 2025-01-05 PROCEDURE — 96374 THER/PROPH/DIAG INJ IV PUSH: CPT

## 2025-01-05 RX ORDER — HYDROCODONE BITARTRATE AND ACETAMINOPHEN 5; 325 MG/1; MG/1
1 TABLET ORAL EVERY 6 HOURS PRN
Qty: 30 TABLET | Refills: 0 | Status: SHIPPED | OUTPATIENT
Start: 2025-01-05

## 2025-01-05 RX ORDER — ONDANSETRON 4 MG/1
8 TABLET, ORALLY DISINTEGRATING ORAL ONCE
Status: COMPLETED | OUTPATIENT
Start: 2025-01-05 | End: 2025-01-05

## 2025-01-05 RX ADMIN — HYDROMORPHONE HYDROCHLORIDE 1 MG: 1 INJECTION, SOLUTION INTRAMUSCULAR; INTRAVENOUS; SUBCUTANEOUS at 11:42

## 2025-01-05 RX ADMIN — ONDANSETRON 8 MG: 4 TABLET, ORALLY DISINTEGRATING ORAL at 11:40

## 2025-01-05 NOTE — DISCHARGE INSTRUCTIONS
Cough and deep breathe 2-3 times an hour while awake for 2 weeks.  Using incentive spirometer every hour while awake.  Take the Norco as needed as directed for pain.  Take Colace as directed as needed for constipation.  Follow-up with your primary care provider within 1 week.  Turn to the emergency department if there is increasing shortness of breath, fever, worsening pain, numbness, tingling, weakness, change in bladder or bowel function, worse in any way at all.

## 2025-01-05 NOTE — ED PROVIDER NOTES
Subjective   History of Present Illness    Chief complaint: Fall with pain    Location: Left posterior chest wall    Quality/Severity: Moderate    Timing/Onset/Duration: After fall at 3:30 PM yesterday    Modifying Factors: Hurts to touch the injured area    Associated Symptoms: No loss of consciousness no neck or back pain.  No headache.  Left posterior inferior chest wall pain and bruising.  No abdominal pain.  No nausea or vomiting.  No numbness, tingling, weakness, change in color or temperature of the extremities.  There is a minor bruise to the left elbow and abrasion.    Narrative: This 74-year-old white male on Eliquis presents after a fall.  Patient missed his last 3 doses of Eliquis.    PCP:Mariana Ferro MD    Review of Systems   HENT:  Negative for nosebleeds and rhinorrhea.    Respiratory:  Negative for shortness of breath.    Cardiovascular:  Positive for chest pain (Chest wall from from fall).   Gastrointestinal:  Negative for abdominal pain, nausea and vomiting.   Genitourinary:  Negative for difficulty urinating.   Musculoskeletal:  Negative for back pain.        Contusion left elbow and minor abrasion.   Neurological:  Negative for weakness, numbness and headaches.       Past Medical History:   Diagnosis Date    Arthritis     Atrial fibrillation     Colon polyps     Hypertension     SBO (small bowel obstruction)     Sleep apnea     CPAP       Allergies   Allergen Reactions    Metoprolol Arrhythmia     Per patient it causes his AFIB to become worse    Erythromycin Hives       Past Surgical History:   Procedure Laterality Date    CARDIAC SURGERY      HEART STENTS    COLONOSCOPY      COLONOSCOPY N/A 12/14/2020    Procedure: COLONOSCOPY to CECUM AND TI WITH COLD BX/HOT SNARE POLYPECTOMY, NS INJECTION, TATTOO INJECTION;  Surgeon: Anne Cho MD;  Location: [facility] ENDOSCOPY;  Service: General;  Laterality: N/A;  HX POLYPS, SCREENING  --POLYPS (4), DIVERTICULOSIS     COLONOSCOPY N/A 10/24/2022     Procedure: COLONOSCOPY TO CECUM WITH HOT SNARE POLYPECTOMIES;  Surgeon: Anne Cho MD;  Location: [facility] ENDOSCOPY;  Service: General;  Laterality: N/A;  HX POLYPS   --  POLYPS, DIVERTICULOSIS    GALLBLADDER SURGERY  2009    JOINT REPLACEMENT      REPLACEMENT TOTAL KNEE BILATERAL  7/2019, 3/2020       Family History   Problem Relation Age of Onset    Malig Hyperthermia Neg Hx        Social History     Socioeconomic History    Marital status:    Tobacco Use    Smoking status: Never    Smokeless tobacco: Never   Vaping Use    Vaping status: Never Used   Substance and Sexual Activity    Alcohol use: Not Currently     Alcohol/week: 5.0 standard drinks of alcohol     Types: 5 Glasses of wine per week    Drug use: Never    Sexual activity: Defer           Objective   Physical Exam  Vitals (The blood pressure is 140/69, temperature is 98.  The heart rate is 85.  The respirations 18.  The room air pulse ox is 93%.) and nursing note reviewed.   Constitutional:       Appearance: Normal appearance.   HENT:      Head: Normocephalic and atraumatic.      Right Ear: Tympanic membrane normal.      Left Ear: Tympanic membrane normal.      Nose: Nose normal. No rhinorrhea.      Mouth/Throat:      Mouth: Mucous membranes are moist.   Neck:      Comments: There is no tenderness, deformity, or bony step-offs upon palpation of the cervical, thoracic, lumbar sacrococcygeal spine.  Cardiovascular:      Rate and Rhythm: Normal rate and regular rhythm.      Pulses: Normal pulses.      Heart sounds: Normal heart sounds. No murmur heard.     No friction rub. No gallop.   Pulmonary:      Effort: Pulmonary effort is normal. No respiratory distress.      Breath sounds: Normal breath sounds. No stridor. No wheezing, rhonchi or rales.   Chest:      Chest wall: Tenderness (Left posterior inferior chest wall.) present.   Abdominal:      General: Abdomen is flat. Bowel sounds are normal. There is no distension.      Palpations: Abdomen is  soft. There is no mass.      Tenderness: There is no abdominal tenderness. There is no right CVA tenderness, left CVA tenderness, guarding or rebound.      Hernia: No hernia is present.   Musculoskeletal:         General: No swelling or tenderness. Normal range of motion.   Skin:     General: Skin is warm and dry.      Capillary Refill: Capillary refill takes less than 2 seconds.   Neurological:      General: No focal deficit present.      Mental Status: He is alert and oriented to person, place, and time.      Cranial Nerves: No cranial nerve deficit.      Sensory: No sensory deficit.      Motor: No weakness.         Procedures           ED Course      09:33 EST, 01/05/25:  The patient does not wish to get an x-ray of the elbow.    12:20 EST, 01/05/25:  The patient's diagnosis of rib fractures and L1 transverse vertebrae fracture was discussed with him.  The patient will be given a prescription for Norco.  He should cough and deep breathe 2-3 times an hour while awake for 2 weeks.  Patient should use incentive spirometer once an hour while awake for 2 weeks.  Patient is to take Colace as needed as directed constipation if she is taking the Norco for pain.  The patient should follow-up with Dr. Ferro within 1 week.  Patient should return to the emergency department if there is increased pain, fever, shortness of breath, numbness, tingling, weakness, change in bladder or bowel function, worsening way at all.  The patient question were answered he will be discharged in good condition.                                                 Medical Decision Making      Final diagnoses:   Fall, initial encounter   Closed fracture of multiple ribs of left side, initial encounter   Closed fracture of transverse process of lumbar vertebra, initial encounter       ED Disposition  ED Disposition       None            No follow-up provider specified.       Medication List      No changes were made to your prescriptions during this  visit.       No orders to display     Labs Reviewed - No data to display  No results found.    Final diagnoses:   None         ED Medications:  Medications - No data to display    New Medications:     Medication List        ASK your doctor about these medications      allopurinol 100 MG tablet  Commonly known as: ZYLOPRIM     amLODIPine 5 MG tablet  Commonly known as: NORVASC     apixaban 2.5 MG tablet tablet  Commonly known as: ELIQUIS  Take 1 tablet by mouth Every 12 (Twelve) Hours.     aspirin 81 MG chewable tablet     buPROPion  MG 12 hr tablet  Commonly known as: WELLBUTRIN SR     dronedarone 400 MG tablet  Commonly known as: MULTAQ     levothyroxine 100 MCG tablet  Commonly known as: SYNTHROID, LEVOTHROID     lisinopril 5 MG tablet  Commonly known as: PRINIVIL,ZESTRIL     rosuvastatin 10 MG tablet  Commonly known as: CRESTOR              Stopped Medications:     Medication List        ASK your doctor about these medications      allopurinol 100 MG tablet  Commonly known as: ZYLOPRIM     amLODIPine 5 MG tablet  Commonly known as: NORVASC     apixaban 2.5 MG tablet tablet  Commonly known as: ELIQUIS  Take 1 tablet by mouth Every 12 (Twelve) Hours.     aspirin 81 MG chewable tablet     buPROPion  MG 12 hr tablet  Commonly known as: WELLBUTRIN SR     dronedarone 400 MG tablet  Commonly known as: MULTAQ     levothyroxine 100 MCG tablet  Commonly known as: SYNTHROID, LEVOTHROID     lisinopril 5 MG tablet  Commonly known as: PRINIVIL,ZESTRIL     rosuvastatin 10 MG tablet  Commonly known as: CRESTOR                   Magdiel Hamlin MD  01/05/25 1222

## 2025-02-14 RX ORDER — BUPROPION HYDROCHLORIDE 300 MG/1
300 TABLET ORAL DAILY
COMMUNITY
Start: 2024-10-15 | End: 2025-10-15

## 2025-02-14 RX ORDER — ALLOPURINOL 300 MG/1
300 TABLET ORAL DAILY
COMMUNITY
Start: 2024-10-23

## 2025-02-14 RX ORDER — FAMOTIDINE 40 MG/1
40 TABLET, FILM COATED ORAL AS NEEDED
COMMUNITY

## 2025-02-14 RX ORDER — APIXABAN 5 MG/1
1 TABLET, FILM COATED ORAL 2 TIMES DAILY
Status: ON HOLD | COMMUNITY
Start: 2024-11-08 | End: 2025-02-17

## 2025-02-14 RX ORDER — NITROGLYCERIN 0.4 MG/1
0.4 TABLET SUBLINGUAL
COMMUNITY
Start: 2024-11-29

## 2025-02-14 RX ORDER — ASPIRIN 81 MG/1
81 TABLET ORAL DAILY
Status: ON HOLD | COMMUNITY
End: 2025-02-17

## 2025-02-17 ENCOUNTER — HOSPITAL ENCOUNTER (OUTPATIENT)
Facility: HOSPITAL | Age: 75
Setting detail: HOSPITAL OUTPATIENT SURGERY
Discharge: HOME | End: 2025-02-17
Attending: SURGERY | Admitting: SURGERY
Payer: COMMERCIAL

## 2025-02-17 ENCOUNTER — ANESTHESIA EVENT (OUTPATIENT)
Dept: GASTROENTEROLOGY | Facility: HOSPITAL | Age: 75
End: 2025-02-17
Payer: COMMERCIAL

## 2025-02-17 ENCOUNTER — ANESTHESIA (OUTPATIENT)
Dept: GASTROENTEROLOGY | Facility: HOSPITAL | Age: 75
End: 2025-02-17
Payer: COMMERCIAL

## 2025-02-17 VITALS
OXYGEN SATURATION: 94 % | WEIGHT: 288.9 LBS | DIASTOLIC BLOOD PRESSURE: 75 MMHG | HEIGHT: 72 IN | BODY MASS INDEX: 39.13 KG/M2 | RESPIRATION RATE: 16 BRPM | HEART RATE: 75 BPM | SYSTOLIC BLOOD PRESSURE: 103 MMHG

## 2025-02-17 DIAGNOSIS — Z86.0101 HISTORY OF ADENOMATOUS POLYP OF COLON: ICD-10-CM

## 2025-02-17 PROCEDURE — 88305 TISSUE EXAM BY PATHOLOGIST: CPT | Performed by: SURGERY

## 2025-02-17 PROCEDURE — 45380 COLONOSCOPY AND BIOPSY: CPT | Performed by: SURGERY

## 2025-02-17 PROCEDURE — 25010000002 LIDOCAINE 2% SOLUTION: Performed by: NURSE ANESTHETIST, CERTIFIED REGISTERED

## 2025-02-17 PROCEDURE — 25010000002 GLUCAGON (RDNA) PER 1 MG: Performed by: SURGERY

## 2025-02-17 PROCEDURE — 25810000003 LACTATED RINGERS PER 1000 ML: Performed by: SURGERY

## 2025-02-17 PROCEDURE — 25010000002 PROPOFOL 1000 MG/100ML EMULSION: Performed by: NURSE ANESTHETIST, CERTIFIED REGISTERED

## 2025-02-17 PROCEDURE — 25010000002 PROPOFOL 200 MG/20ML EMULSION: Performed by: NURSE ANESTHETIST, CERTIFIED REGISTERED

## 2025-02-17 PROCEDURE — 45385 COLONOSCOPY W/LESION REMOVAL: CPT | Performed by: SURGERY

## 2025-02-17 RX ORDER — IBUPROFEN 600 MG/1
TABLET ORAL AS NEEDED
Status: DISCONTINUED | OUTPATIENT
Start: 2025-02-17 | End: 2025-02-17 | Stop reason: HOSPADM

## 2025-02-17 RX ORDER — LIDOCAINE HYDROCHLORIDE 20 MG/ML
INJECTION, SOLUTION INFILTRATION; PERINEURAL AS NEEDED
Status: DISCONTINUED | OUTPATIENT
Start: 2025-02-17 | End: 2025-02-17 | Stop reason: SURG

## 2025-02-17 RX ORDER — APIXABAN 5 MG/1
5 TABLET, FILM COATED ORAL 2 TIMES DAILY
Start: 2025-02-18

## 2025-02-17 RX ORDER — ASPIRIN 81 MG/1
81 TABLET ORAL DAILY
Start: 2025-02-17

## 2025-02-17 RX ORDER — PROPOFOL 10 MG/ML
INJECTION, EMULSION INTRAVENOUS AS NEEDED
Status: DISCONTINUED | OUTPATIENT
Start: 2025-02-17 | End: 2025-02-17 | Stop reason: SURG

## 2025-02-17 RX ORDER — PROPOFOL 10 MG/ML
INJECTION, EMULSION INTRAVENOUS CONTINUOUS PRN
Status: DISCONTINUED | OUTPATIENT
Start: 2025-02-17 | End: 2025-02-17 | Stop reason: SURG

## 2025-02-17 RX ORDER — SODIUM CHLORIDE, SODIUM LACTATE, POTASSIUM CHLORIDE, CALCIUM CHLORIDE 600; 310; 30; 20 MG/100ML; MG/100ML; MG/100ML; MG/100ML
20 INJECTION, SOLUTION INTRAVENOUS CONTINUOUS
Status: DISCONTINUED | OUTPATIENT
Start: 2025-02-17 | End: 2025-02-17 | Stop reason: HOSPADM

## 2025-02-17 RX ADMIN — PROPOFOL 200 MCG/KG/MIN: 10 INJECTION, EMULSION INTRAVENOUS at 07:34

## 2025-02-17 RX ADMIN — PROPOFOL INJECTABLE EMULSION 100 MG: 10 INJECTION, EMULSION INTRAVENOUS at 07:34

## 2025-02-17 RX ADMIN — SODIUM CHLORIDE, POTASSIUM CHLORIDE, SODIUM LACTATE AND CALCIUM CHLORIDE 20 ML/HR: 600; 310; 30; 20 INJECTION, SOLUTION INTRAVENOUS at 06:44

## 2025-02-17 RX ADMIN — LIDOCAINE HYDROCHLORIDE 60 MG: 20 INJECTION, SOLUTION INFILTRATION; PERINEURAL at 07:34

## 2025-02-17 NOTE — OP NOTE
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    Eliquis use, held.    Post-operative Diagnosis:  Ileocecal valved polyp, rounded, wide based, at site of prior polypectomy, 8 mm.  Removed via snare cautery and base fulgurated  Proximal ascending colon polyp, rounded, 7 mm.  Removed via snare cautery  Mid ascneding colon polyp, 8 mm.  Rounded.  Removed via snare cautery polypectomy  Distal ascending colon polyp, 4 mm.  Removed via cold biopsy forceps  Sigmoid diverticulosis    Procedure: Colonoscopy with snare cautery polypectomy with fulguration, and biopsy     Findings/Treatments:   As above       Recommendations:   Colonoscopy in 2 years likely, based on pathology.  The office will call within the next  3-10 days with a final recommendation.  Keep a copy of the photographs of the procedure given to you today for possible need for reference in the future.      Surgeon: Tammie    Anesthetic: MAC per Yao Leyva MD    Scope Withdrawal Time:  17 minutes  54 seconds    Procedure Details     MAC anesthesia was induced.  The 180 Colonoscopy was inserted blindly into the rectum and advanced to the cecum, with relative ease,  without need for pressure, lift, or turning.  In order to get into the cecal bowl, however, lift was needed.   Cecum was identified by the appendiceal orifice and the ileocecal valve and photographed for documentation.      Prep quality was variable, good on the left, with lots of debris on the right, particularly the cecum which led to the scope becoming clogged and repeated need to remove the button.  A careful inspection was made as the scope was withdrawn, including a retroflexed view of the rectum; there was no suggestion of presence of angiodysplasias, colitis, but there were polyps and the  diverticula, with noted interventions. Settings on the cautery were 1 and 11 with tissue retrieved via suction thru the scope.     Retroflexion in the rectum revealed no abnormalities.      Anne Cho MD  02/17/25

## 2025-02-17 NOTE — ANESTHESIA PREPROCEDURE EVALUATION
Anesthesia Evaluation     Patient summary reviewed and Nursing notes reviewed                Airway   Mallampati: III  TM distance: >3 FB  Neck ROM: limited  Possible difficult intubation and Large neck circumference  Dental      Pulmonary    (+) ,sleep apnea on CPAP  Cardiovascular     ECG reviewed  PT is on anticoagulation therapy  Rhythm: regular  Rate: normal    (+) hypertension, CAD, cardiac stents Drug eluting stent more than 12 months ago , dysrhythmias Paroxysmal Atrial Fib      Neuro/Psych  (+) headaches  GI/Hepatic/Renal/Endo    (+) morbid obesity, GERD    Musculoskeletal     Abdominal    Substance History - negative use     OB/GYN negative ob/gyn ROS         Other   arthritis,                 Anesthesia Plan    ASA 3     MAC     intravenous induction     Anesthetic plan, risks, benefits, and alternatives have been provided, discussed and informed consent has been obtained with: patient and spouse/significant other.    CODE STATUS:

## 2025-02-17 NOTE — H&P
Cc: Endoscopy Visit    HPI: 75 y.o. 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      Past Medical History:   Diagnosis Date    Arthritis     Atrial fibrillation     ELIQUIS.  SEES DR. FONTANA YEARLY    CAD (coronary artery disease)     STENT X2    Colon polyps     GERD (gastroesophageal reflux disease)     AT TIMES:  PEPCID OTC    History of hepatitis     YEARS AGO    History of recent fall     PT STATES FELL ABOUT 6 WEEKS AGO:  TRIPPED.  STATES FRACTURED 4 RIBS AND VERTEBRAE:  DID HAVE CT SCAN DONE.  STATES RIBS ARE STILL  A  LITTLE SORE    Hypertension     Migraine     SBO (small bowel obstruction)     HISTORY    Sleep apnea     CPAP       Past Surgical History:   Procedure Laterality Date    CARDIAC CATHETERIZATION      STENT X2    COLONOSCOPY      COLONOSCOPY N/A 12/14/2020    Procedure: COLONOSCOPY to CECUM AND TI WITH COLD BX/HOT SNARE POLYPECTOMY, NS INJECTION, TATTOO INJECTION;  Surgeon: Anne Cho MD;  Location:  [facility];  Service: General;  Laterality: N/A;  HX POLYPS, SCREENING  --POLYPS (4), DIVERTICULOSIS     COLONOSCOPY N/A 10/24/2022    Procedure: COLONOSCOPY TO CECUM WITH HOT SNARE POLYPECTOMIES;  Surgeon: Anne Cho MD;  Location:  [facility];  Service: General;  Laterality: N/A;  HX POLYPS   --  POLYPS, DIVERTICULOSIS    GALLBLADDER SURGERY  2009    REPLACEMENT TOTAL KNEE BILATERAL  7/2019, 3/2020    TONSILLECTOMY         is allergic to metoprolol, erythromycin, and hydroxyzine.       Medication List        ASK your doctor about these medications      allopurinol 300 MG tablet  Commonly known as: ZYLOPRIM     amLODIPine 5 MG tablet  Commonly known as: NORVASC     aspirin 81 MG EC tablet     buPROPion  MG 24 hr tablet  Commonly known as: WELLBUTRIN XL     dronedarone 400 MG tablet  Commonly known as: MULTAQ     Eliquis 5 MG tablet tablet  Generic drug: apixaban     famotidine 40 MG  tablet  Commonly known as: PEPCID     levothyroxine 100 MCG tablet  Commonly known as: SYNTHROID, LEVOTHROID     lisinopril 5 MG tablet  Commonly known as: PRINIVIL,ZESTRIL     nitroglycerin 0.4 MG SL tablet  Commonly known as: NITROSTAT     rosuvastatin 10 MG tablet  Commonly known as: CRESTOR              Family History   Problem Relation Age of Onset    Malig Hyperthermia Neg Hx        Social History     Socioeconomic History    Marital status:    Tobacco Use    Smoking status: Never    Smokeless tobacco: Never   Vaping Use    Vaping status: Never Used   Substance and Sexual Activity    Alcohol use: Not Currently     Alcohol/week: 5.0 standard drinks of alcohol     Types: 5 Glasses of wine per week    Drug use: Never    Sexual activity: Defer       Vitals:    02/17/25 0629   BP: 142/76   Pulse: 78   Resp: 16   SpO2: 96%       Body mass index is 39.18 kg/m².      Physical Exam    General: No acute distress  Lungs: No labored breathing, Pulse oximetry on room air is 96%.  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    Eliquis use, held.     Plan:  HUMBERTO Cho MD  07:29 EST

## 2025-02-17 NOTE — ANESTHESIA POSTPROCEDURE EVALUATION
Patient: Paulino Pimentel    Procedure Summary       Date: 02/17/25 Room / Location: [facility] ENDOSCOPY 1 / [facility] ENDOSCOPY    Anesthesia Start: 0728 Anesthesia Stop: 0806    Procedure: COLONOSCOPY INTO CECUM WITH HOT SNARE POLYPECTOMIES & COLD BIOPSY POLYPECTOMY Diagnosis:       History of adenomatous polyp of colon      (History of adenomatous polyp of colon [Z86.0101])    Surgeons: Anne Cho MD Provider: Yao Leyva MD    Anesthesia Type: MAC ASA Status: 3            Anesthesia Type: MAC    Vitals  Vitals Value Taken Time   /75 02/17/25 0825   Temp     Pulse 61 02/17/25 0832   Resp 16 02/17/25 0825   SpO2 94 % 02/17/25 0832   Vitals shown include unfiled device data.        Post Anesthesia Care and Evaluation    Patient location during evaluation: PACU  Patient participation: complete - patient participated  Level of consciousness: awake and alert  Pain management: adequate    Airway patency: patent  Anesthetic complications: No anesthetic complications    Cardiovascular status: acceptable  Respiratory status: acceptable  Hydration status: acceptable    Comments: --------------------            02/17/25               0825     --------------------   BP:       103/75     Pulse:      75       Resp:       16       SpO2:      94%      --------------------

## 2025-02-17 NOTE — DISCHARGE INSTRUCTIONS
For the next 24 hours patient needs to be with a responsible adult.    For THE REST OF TODAY DO NOT drive, operate machinery, appliances, drink alcohol, make important decisions or sign legal documents.    Start with a light or bland diet if you are feeling sick to your stomach otherwise advance to regular diet as tolerated.    Follow recommendations on procedure report if provided by your doctor.    Call Dr Cho     Problems may include but not limited to: large amounts of bleeding, trouble breathing, repeated vomiting, severe unrelieved pain, fever or chills.      If biopsies or polyps were taken, MD will call you with the results in about 7 days. If you don't hear from the MD in 2 weeks, call the number above.

## 2025-02-18 NOTE — PROGRESS NOTES
Kate (endoscopy liaison),    Please call patient tto inform them of these findings and recommendations and ensure that any pamphlets that were to be given to the patient at the hospital were received.  Ensure that a letter is sent to the patient, recall method entered into the computer and the HM (Health Maintenance) section updated as to recommended endoscopy follow up.    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    · Eliquis use, held.    Post-operative Diagnosis:  · Ileocecal valved polyp, rounded, wide based, at site of prior polypectomy, 8 mm.  Removed via snare cautery and base fulgurated;  TUBULOVILLOUS ADENOMA  · Proximal ascending colon polyp, rounded, 7 mm.  Removed via snare cautery;  TUBULAR ADENOMA  · Mid ascneding colon polyp, 8 mm.  Rounded.  Removed via snare cautery polypectomy;  HYPERPLASTIC  · Distal ascending colon polyp, 4 mm.  Removed via cold biopsy forceps;  TUBULAR ADENOMA  · Sigmoid diverticulosis    Procedure: Colonoscopy with snare cautery polypectomy with fulguration, and biopsy     Findings/Treatments:   · As above    Recommendations:   1. Colonoscopy in 2 years likely, based on pathology.  The office will call within the next  3-10 days with a final recommendation.;  2 YEARS  2. Keep a copy of the photographs of the procedure given to you today for possible need for reference in the future.

## 2025-02-19 ENCOUNTER — TELEPHONE (OUTPATIENT)
Dept: SURGERY | Facility: CLINIC | Age: 75
End: 2025-02-19
Payer: COMMERCIAL

## 2025-02-19 NOTE — TELEPHONE ENCOUNTER
----- Message from nAne Cho sent at 2/18/2025  3:07 PM EST -----  Kate (endoscopy liaison),    Please call patient tto inform them of these findings and recommendations and ensure that any pamphlets that were to be given to the patient at the hospital were received.  Ensure that a letter is sent to the patient, recall method entered into the computer and the HM (Health Maintenance) section updated as to recommended endoscopy follow up.    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    · Eliquis use, held.     Post-operative Diagnosis:  · Ileocecal valved polyp, rounded, wide based, at site of prior polypectomy, 8 mm.  Removed via snare cautery and base fulgurated;  TUBULOVILLOUS ADENOMA  · Proximal ascending colon polyp, rounded, 7 mm.  Removed via snare cautery;  TUBULAR ADENOMA  · Mid ascneding colon polyp, 8 mm.  Rounded.  Removed via snare cautery polypectomy;  HYPERPLASTIC  · Distal ascending colon polyp, 4 mm.  Removed via cold biopsy forceps;  TUBULAR ADENOMA  · Sigmoid diverticulosis     Procedure: Colonoscopy with snare cautery polypectomy with fulguration, and biopsy      Findings/Treatments:   · As above                                        Recommendations:   1. Colonoscopy in 2 years likely, based on pathology.  The office will call within the next  3-10 days with a final recommendation.;  2 YEARS  2. Keep a copy of the photographs of the procedure given to you today for possible need for reference in the future.

## 2025-02-19 NOTE — TELEPHONE ENCOUNTER
----- Message from Anne Cho sent at 2/18/2025  3:07 PM EST -----  Kate (endoscopy liaison),    Please call patient tto inform them of these findings and recommendations and ensure that any pamphlets that were to be given to the patient at the hospital were received.  Ensure that a letter is sent to the patient, recall method entered into the computer and the HM (Health Maintenance) section updated as to recommended endoscopy follow up.    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    · Eliquis use, held.     Post-operative Diagnosis:  · Ileocecal valved polyp, rounded, wide based, at site of prior polypectomy, 8 mm.  Removed via snare cautery and base fulgurated;  TUBULOVILLOUS ADENOMA  · Proximal ascending colon polyp, rounded, 7 mm.  Removed via snare cautery;  TUBULAR ADENOMA  · Mid ascneding colon polyp, 8 mm.  Rounded.  Removed via snare cautery polypectomy;  HYPERPLASTIC  · Distal ascending colon polyp, 4 mm.  Removed via cold biopsy forceps;  TUBULAR ADENOMA  · Sigmoid diverticulosis     Procedure: Colonoscopy with snare cautery polypectomy with fulguration, and biopsy      Findings/Treatments:   · As above                                        Recommendations:   1. Colonoscopy in 2 years likely, based on pathology.  The office will call within the next  3-10 days with a final recommendation.;  2 YEARS  2. Keep a copy of the photographs of the procedure given to you today for possible need for reference in the future.

## (undated) DEVICE — MSK ENDO PORT O2 POM ELITE CURAPLEX A/

## (undated) DEVICE — THE SINGLE USE ETRAP – POLYP TRAP IS USED FOR SUCTION RETRIEVAL OF ENDOSCOPICALLY REMOVED POLYPS.: Brand: ETRAP

## (undated) DEVICE — KT ORCA ORCAPOD DISP STRL

## (undated) DEVICE — CANN O2 ETCO2 FITS ALL CONN CO2 SMPL A/ 7IN DISP LF

## (undated) DEVICE — FRCP BIOP RADLJAW4 HOT 2.2X240 BX40

## (undated) DEVICE — TUBING, SUCTION, 1/4" X 10', STRAIGHT: Brand: MEDLINE

## (undated) DEVICE — ADAPT CLN BIOGUARD AIR/H2O DISP

## (undated) DEVICE — LN SMPL CO2 SHTRM SD STREAM W/M LUER

## (undated) DEVICE — PATIENT RETURN ELECTRODE, SINGLE-USE, CONTACT QUALITY MONITORING, ADULT, WITH 9FT CORD, FOR PATIENTS WEIGING OVER 33LBS. (15KG): Brand: MEGADYNE

## (undated) DEVICE — MSK PROC CURAPLEX O2 2/ADAPT 7FT

## (undated) DEVICE — THE CARR-LOCKE INJECTION NEEDLE IS A SINGLE USE, DISPOSABLE, FLEXIBLE SHEATH INJECTION NEEDLE USED FOR THE INJECTION OF VARIOUS TYPES OF MEDIA THROUGH FLEXIBLE ENDOSCOPES.

## (undated) DEVICE — THE TORRENT IRRIGATION SCOPE CONNECTOR IS USED WITH THE TORRENT IRRIGATION TUBING TO PROVIDE IRRIGATION FLUIDS SUCH AS STERILE WATER DURING GASTROINTESTINAL ENDOSCOPIC PROCEDURES WHEN USED IN CONJUNCTION WITH AN IRRIGATION PUMP (OR ELECTROSURGICAL UNIT).: Brand: TORRENT

## (undated) DEVICE — Device: Brand: SPOT EX ENDOSCOPIC TATTOO

## (undated) DEVICE — SNAR POLYP CAPTIVATOR CRSNT 27MM 240CM

## (undated) DEVICE — SINGLE-USE BIOPSY FORCEPS: Brand: RADIAL JAW 4

## (undated) DEVICE — SNAR POLYP SENSATION STDOVL 27 240 BX40

## (undated) DEVICE — SENSR O2 OXIMAX FNGR A/ 18IN NONSTR